# Patient Record
Sex: FEMALE | Race: WHITE
[De-identification: names, ages, dates, MRNs, and addresses within clinical notes are randomized per-mention and may not be internally consistent; named-entity substitution may affect disease eponyms.]

---

## 2018-04-03 ENCOUNTER — HOSPITAL ENCOUNTER (OUTPATIENT)
Dept: HOSPITAL 96 - M.ERS | Age: 83
Setting detail: OBSERVATION
LOS: 1 days | Discharge: HOME | End: 2018-04-04
Attending: INTERNAL MEDICINE | Admitting: INTERNAL MEDICINE
Payer: MEDICARE

## 2018-04-03 VITALS — DIASTOLIC BLOOD PRESSURE: 61 MMHG | SYSTOLIC BLOOD PRESSURE: 127 MMHG

## 2018-04-03 VITALS — SYSTOLIC BLOOD PRESSURE: 119 MMHG | DIASTOLIC BLOOD PRESSURE: 48 MMHG

## 2018-04-03 VITALS — BODY MASS INDEX: 32.79 KG/M2 | HEIGHT: 60 IN | WEIGHT: 167 LBS

## 2018-04-03 VITALS — DIASTOLIC BLOOD PRESSURE: 64 MMHG | SYSTOLIC BLOOD PRESSURE: 171 MMHG

## 2018-04-03 VITALS — DIASTOLIC BLOOD PRESSURE: 62 MMHG | SYSTOLIC BLOOD PRESSURE: 155 MMHG

## 2018-04-03 DIAGNOSIS — E78.5: ICD-10-CM

## 2018-04-03 DIAGNOSIS — I12.9: ICD-10-CM

## 2018-04-03 DIAGNOSIS — I65.23: ICD-10-CM

## 2018-04-03 DIAGNOSIS — E11.65: ICD-10-CM

## 2018-04-03 DIAGNOSIS — I73.9: ICD-10-CM

## 2018-04-03 DIAGNOSIS — R79.89: ICD-10-CM

## 2018-04-03 DIAGNOSIS — I16.0: ICD-10-CM

## 2018-04-03 DIAGNOSIS — Z82.49: ICD-10-CM

## 2018-04-03 DIAGNOSIS — M19.012: ICD-10-CM

## 2018-04-03 DIAGNOSIS — N18.3: ICD-10-CM

## 2018-04-03 DIAGNOSIS — E11.22: ICD-10-CM

## 2018-04-03 DIAGNOSIS — Z85.41: ICD-10-CM

## 2018-04-03 DIAGNOSIS — R07.89: Primary | ICD-10-CM

## 2018-04-03 DIAGNOSIS — D64.9: ICD-10-CM

## 2018-04-03 LAB
ABSOLUTE BASOPHILS: 0 THOU/UL (ref 0–0.2)
ABSOLUTE EOSINOPHILS: 0 THOU/UL (ref 0–0.7)
ABSOLUTE MONOCYTES: 0.8 THOU/UL (ref 0–1.2)
ALBUMIN SERPL-MCNC: 3.3 G/DL (ref 3.4–5)
ALP SERPL-CCNC: 73 U/L (ref 46–116)
ALT SERPL-CCNC: 15 U/L (ref 30–65)
ANION GAP SERPL CALC-SCNC: 8 MMOL/L (ref 7–16)
APTT BLD: 25.9 SECONDS (ref 25–31.3)
AST SERPL-CCNC: 16 U/L (ref 15–37)
BASOPHILS NFR BLD AUTO: 0.4 %
BILIRUB SERPL-MCNC: 0.4 MG/DL
BUN SERPL-MCNC: 21 MG/DL (ref 7–18)
CALCIUM SERPL-MCNC: 8.2 MG/DL (ref 8.5–10.1)
CHLORIDE SERPL-SCNC: 107 MMOL/L (ref 98–107)
CK-MB MASS: 0.8 NG/ML
CO2 SERPL-SCNC: 28 MMOL/L (ref 21–32)
CREAT SERPL-MCNC: 1.4 MG/DL (ref 0.6–1.3)
EOSINOPHIL NFR BLD: 0.2 %
GLUCOSE SERPL-MCNC: 261 MG/DL (ref 70–99)
GRANULOCYTES NFR BLD MANUAL: 75.2 %
HCT VFR BLD CALC: 33 % (ref 37–47)
HGB BLD-MCNC: 11.4 GM/DL (ref 12–15)
INR PPP: 1
LIPASE: 262 U/L (ref 73–393)
LYMPHOCYTES # BLD: 1.1 THOU/UL (ref 0.8–5.3)
LYMPHOCYTES NFR BLD AUTO: 14.2 %
MAGNESIUM SERPL-MCNC: 1 MG/DL (ref 1.8–2.4)
MCH RBC QN AUTO: 31.1 PG (ref 26–34)
MCHC RBC AUTO-ENTMCNC: 34.5 G/DL (ref 28–37)
MCV RBC: 90.1 FL (ref 80–100)
MONOCYTES NFR BLD: 10 %
MPV: 8.7 FL. (ref 7.2–11.1)
NEUTROPHILS # BLD: 6 THOU/UL (ref 1.6–8.1)
NT-PRO BRAIN NAT PEPTIDE: 669 PG/ML (ref ?–300)
NUCLEATED RBCS: 0 /100WBC
PLATELET COUNT*: 139 THOU/UL (ref 150–400)
POTASSIUM SERPL-SCNC: 3.9 MMOL/L (ref 3.5–5.1)
PROT SERPL-MCNC: 7 G/DL (ref 6.4–8.2)
PROTHROMBIN TIME: 10.1 SECONDS (ref 9.2–11.5)
RBC # BLD AUTO: 3.66 MIL/UL (ref 4.2–5)
RDW-CV: 13.1 % (ref 10.5–14.5)
SODIUM SERPL-SCNC: 143 MMOL/L (ref 136–145)
TROPONIN-I LEVEL: <0.06 NG/ML (ref ?–0.06)
WBC # BLD AUTO: 7.9 THOU/UL (ref 4–11)

## 2018-04-03 NOTE — NUR
RECIEVED REPORT FROM DRARIAN IN ED AND ASSUMED CARE OF PT AT 1700. PT
VSS,TRACING SR ON MONITOR,LUNG SOUNDS ARE CLEAR ON ROOM AIR, O2 %.PT
STATES HAD BM THIS MORNING.IV RIGHT AC SALINE LOCKED AND PATENT. PT IS CALM
AND COOPERATIVE WITH NO C/O PAIN. PT IS UP AD JAMES WITH [facility]. PT WAS LEFT IN
RECLINER CHAIR WITH CALL LIGHT IN PLACE.HOURLY ROUNDING COMPLETED FOR PT
SAFETY.PT INFORMED OF PLAN OF CARE AND COMMUNICATES UNDERSTANDING. PT IS TO BE
NPO AFTER MIDNIGHT AND WAS EDUCATED. NURSE WILL PASS TO NIGHT SHIFT. WILL
CONTINUE TO MONITOR FOR DURATION OF SHIFT.

## 2018-04-03 NOTE — EKG
[place] [address]
Phone:  [phone]                     ELECTROCARDIOGRAM REPORT      
_______________________________________________________________________________
 
Name:       COJOSEPH BAPTISTEDERRELL STARR                 Room:           [place] [address]   ADM IN  
..#:  [id number]      Account #:      [id number]  
Admission:  18     Attend Phys:    Tara Gold MD 
Discharge:               Date of Birth:  [birth year]  
         Report #: [id number]
    [id number]
_______________________________________________________________________________
THIS REPORT FOR:  //name//                      
 
                         [facility]
                                       
Test Date:    2018               Test Time:    14:11:35
Pat Name:     TRUDY BROWN             Department:   
Patient ID:   [id number]            Room:         [facility]
Gender:       F                        Technician:   
:          1935               Requested By: Butch Miller
Order Number: [id number] MD:   Gregor Thomson
                                 Measurements
Intervals                              Axis          
Rate:         81                       P:            44
NH:           146                      QRS:          -35
QRSD:         132                      T:            5
QT:           376                                    
QTc:          437                                    
                           Interpretive Statements
Sinus rhythm
Right bundle branch block
Left ventricular hypertrophy
No previous ECG available for comparison
 
Electronically Signed On 4-3-2018 16:27:51 CDT by Gregor Thomson
[email or web address]
 
 
 
 
 
 
 
 
 
 
 
 
 
 
 
 
 
 
  <ELECTRONICALLY SIGNED>
                                           By: Gregor Thomson MD, [facility]      
  18     1627
D: 18 1411   _____________________________________
T: 18 141   Gregor Thomson MD, FACC        /EPI

## 2018-04-04 VITALS — SYSTOLIC BLOOD PRESSURE: 154 MMHG | DIASTOLIC BLOOD PRESSURE: 57 MMHG

## 2018-04-04 VITALS — DIASTOLIC BLOOD PRESSURE: 57 MMHG | SYSTOLIC BLOOD PRESSURE: 154 MMHG

## 2018-04-04 VITALS — DIASTOLIC BLOOD PRESSURE: 60 MMHG | SYSTOLIC BLOOD PRESSURE: 124 MMHG

## 2018-04-04 VITALS — DIASTOLIC BLOOD PRESSURE: 55 MMHG | SYSTOLIC BLOOD PRESSURE: 113 MMHG

## 2018-04-04 VITALS — DIASTOLIC BLOOD PRESSURE: 63 MMHG | SYSTOLIC BLOOD PRESSURE: 140 MMHG

## 2018-04-04 LAB
ANION GAP SERPL CALC-SCNC: 8 MMOL/L (ref 7–16)
BUN SERPL-MCNC: 23 MG/DL (ref 7–18)
CALCIUM SERPL-MCNC: 8 MG/DL (ref 8.5–10.1)
CHLORIDE SERPL-SCNC: 106 MMOL/L (ref 98–107)
CHOLEST SERPL-MCNC: 132 MG/DL (ref ?–200)
CO2 SERPL-SCNC: 27 MMOL/L (ref 21–32)
CREAT SERPL-MCNC: 1.3 MG/DL (ref 0.6–1.3)
GLUCOSE SERPL-MCNC: 116 MG/DL (ref 70–99)
HDLC SERPL-MCNC: 70 MG/DL (ref 40–?)
LDLC SERPL-MCNC: 51 MG/DL (ref ?–100)
MAGNESIUM SERPL-MCNC: 3 MG/DL (ref 1.8–2.4)
POTASSIUM SERPL-SCNC: 4 MMOL/L (ref 3.5–5.1)
SODIUM SERPL-SCNC: 141 MMOL/L (ref 136–145)
TC:HDL: 1.9 RATIO
TRIGL SERPL-MCNC: 57 MG/DL (ref ?–150)
VLDLC SERPL CALC-MCNC: 11 MG/DL (ref ?–40)

## 2018-04-04 NOTE — 2DMMODE
[place] [address]
Phone:  [phone] 2 D/M-MODE ECHOCARDIOGRAM     
_______________________________________________________________________________
 
Name:         TRUDY BROWN                Room:          [address]
KIM#:    [id number]     Account #:     [id number]  
Admission:    18    Attend Phys:   Tara Gold, 
Discharge:                Date of Birth: [birth year]  
Date of Service: 18 1424  Report #:      [id number]
        [id number]
_______________________________________________________________________________
THIS REPORT FOR:  //name//                      
 
 
--------------- APPROVED REPORT --------------
 
 
Study performed:  2018 11:34:29
 
EXAM: Comprehensive 2D, Doppler, and color-flow 
Echocardiogram 
Patient Location: In-Patient   
Room #:  [facility]     Status:  routine
 
      BSA:         1.69
HR: 78 bpm BP:          124/60 mmHg 
Rhythm: NSR     
 
Other Information 
Study Quality: Good
 
Indications
Chest Pain
 
2D Dimensions
   LVEF(%):  71.54 (&gt;50%)
IVSd:  12.42 (7-11mm) LVOT Diam:  18.23 (18-24mm) 
LVDd:  45.15 mm  
PWd:  9.71 (7-11mm) Ascending Ao:  31.31 (22-36mm)
LVDs:  26.80 (25-40mm) 
Aortic Root:  28.60 mm 
   Bean's LVEF:  71.54 %
 
Volumes
Left Atrial Volume (Systole) 
    LA ESV Index:  25.70 mL/m2
 
Aortic Valve
AoV Peak Urban.:  1.69 m/s 
AO Peak Gr.:  11.49 mmHg LVOT Max P.00 mmHg
AO Mean Gr.:  6.30 mmHg  LVOT Mean P.92 mmHg
    LVOT Max V:  1.22 m/s
AO V2 VTI:  36.36 cm  LVOT Mean V:  0.78 m/s
GARY (VTI):  2.02 cm2  LVOT V1 VTI:  28.14 cm
AI McCormick:  3.63 m/s2  
AI PHT:  334.66 ms  
 
 
 
[place] [address]
Phone:  [phone]                     2 D/M-MODE ECHOCARDIOGRAM     
_______________________________________________________________________________
 
Name:         TRUDY BROWN                Room:          [address]#:    [id number]     Account #:     [id number]  
Admission:    18    Attend Phys:   Tara Gold, 
Discharge:                Date of Birth: [birth year]  
Date of Service: 18 1424  Report #:      [id number]
        [id number]
_______________________________________________________________________________
Mitral Valve
    E/A Ratio:  0.74
    MV Decel. Time:  248.63 ms
MV E Max Urban.:  0.77 m/s 
MV PHT:  72.10 ms  
MVA (PHT):  3.05 cm2  
 
TDI
E/Lateral E':  7.70 E/Medial E':  9.63
   Medial E' Urban.:  0.08 m/s
   Lateral E' Urban.:  0.10 m/s
 
Pulmonary Valve
PV Peak Urban.:  1.22 m/s PV Peak Gr.:  5.98 mmHg
 
Tricuspid Valve
TR Peak Gr.:  26.30 mmHg RVSP:  31.00 mmHg
 
Left Ventricle
The left ventricle is normal size. There is normal LV segmental wall 
motion. There is normal left ventricular wall thickness. Left 
ventricular systolic function is normal. The left ventricular 
ejection fraction is within the normal range. LVEF is 60-65%. Grade I 
- abnormal relaxation pattern.
 
Right Ventricle
The right ventricle is normal size. The right ventricular systolic 
function is normal.
 
Atria
The left atrium size is normal. The right atrium size is 
normal.
 
Aortic Valve
The aortic valve is normal in structure. Mild aortic regurgitation. 
There is no aortic valvular stenosis.
 
Mitral Valve
The mitral valve is normal in structure. Trace mitral regurgitation. 
No evidence of mitral valve stenosis.
 
Tricuspid Valve
The tricuspid valve is normal in structure. Mild tricuspid 
regurgitation. The RVSP is 30-35 mmHg.
 
Pulmonic Valve
 
 
[place] [address]
Phone:  [phone]                     2 D/M-MODE ECHOCARDIOGRAM     
_______________________________________________________________________________
 
Name:         TRUDY BROWN MATTY                Room:          [address]
M.R.#:    [id number]     Account #:     [id number]  
Admission:    18    Attend Phys:   Tara Gold, 
Discharge:                Date of Birth: [birth year]  
Date of Service: 18 1424  Report #:      [id number]
        [id number]
_______________________________________________________________________________
Pulmonic valve is not well visualized. There is no pulmonic valvular 
regurgitation.
 
Great Vessels
The aortic root is normal in size. IVC is normal in size and 
collapses with &gt;50% inspiration
 
Pericardium
There is no pericardial effusion.
 
&lt;Conclusion&gt;
LVEF is 60-65%.
Mild aortic regurgitation.
 
 
 
 
 
 
 
 
 
 
 
 
 
 
 
 
 
 
 
 
 
 
 
 
 
 
 
 
 
 
 
  <ELECTRONICALLY SIGNED>
                                           By: Gregor Thomson MD, FACC      
  18     1424
D: 18 1424   _____________________________________
T: 18 1424   Gregor Thomson MD, FACC        /INF

## 2018-04-04 NOTE — EKG
[place] [address]
Phone:  [phone]                     ELECTROCARDIOGRAM REPORT      
_______________________________________________________________________________
 
Name:       COJOSEPH BAPTISTELEY MATTY                 Room:           [address]R.#:  [id number]      Account #:      [id number]  
Admission:  18     Attend Phys:    Tara Gold MD 
Discharge:               Date of Birth:  [birth year]  
         Report #: [id number]
    [id number]
_______________________________________________________________________________
THIS REPORT FOR:  //name//                      
 
                          [facility]
                                       
Test Date:    2018               Test Time:    10:20:26
Pat Name:     TRUDY BROWN             Department:   
Patient ID:   [id number]            Room:         [address]
Gender:       F                        Technician:   IAN
:          1935               Requested By: Butch Miller
Order Number: [id number]    Reading MD:   Gregor Thomson
                                 Measurements
Intervals                              Axis          
Rate:         76                       P:            47
CA:           153                      QRS:          -33
QRSD:         130                      T:            2
QT:           400                                    
QTc:          450                                    
                           Interpretive Statements
Sinus rhythm
Right bundle branch block
Left ventricular hypertrophy
Compared to ECG 2018 14:11:35
No significant changes
 
Electronically Signed On 2018 15:48:23 CDT by Gregor Thomson
[email or web address]
 
 
 
 
 
 
 
 
 
 
 
 
 
 
 
 
 
  <ELECTRONICALLY SIGNED>
                                           By: Gregor Thomson MD, [facility]      
  18     1548
D: 18 1020   _____________________________________
T: 18 1020   Gregor Thomson MD, [facility]        /EPI

## 2018-04-04 NOTE — NUR
Pt rested well overnight.  VSS.  NPO since MN for Cardiology consult.  Pt
reports continued pain to L shoulder, rates 3-4/10.  Will continue to monitor.

## 2018-04-04 NOTE — NUR
ASSUMED PT CARE AT 0700 PT IS ALERT AND ORIENTED X 4 PT IS NOT A FALL RISK PT
IS UP AD JAMES, PT STATES SOME PAIN IN LEFT SHOULDER DENIES CHEST PAIN OR SOA,
PT SEEN BY HOSPITALIST WHO ORDERED TO DISCHARGE PT WHEN CLEARED BY CARDIOLOGY
AWAITING CARDIOLOGY TO SEE PT WHO IS NPO UNTIL SEEN BY CARDIOLOGY, PT HAS ECHO
ORDERED ULTRASOUND IN ROOM OBTAINING ULTRASOUND, PT IS SR ON THE MONITOR WILL
CONTINUE TO MONITOR

## 2018-04-04 NOTE — NUR
MET WITH PT TO DISCUSS HOME SITUATION/DC PLANNING. PT LIVES WITH HER 'KIDS'.
STATES SHE IS INDEPENDENT AND ACTIVE.  USES NO EQUIPMENT OR HOME CARE.  PLANS
TO RETURN HOME AT DC.  WILL FOLLOW

## 2018-04-05 LAB
EST. AVERAGE GLUCOSE BLD GHB EST-MCNC: 108 MG/DL
GLYCOHEMOGLOBIN (HGB A1C): 5.4 % (ref 4.8–5.6)

## 2018-04-05 NOTE — CON
[address]  [address]                    CONSULTATION                  
_______________________________________________________________________________
 
Name:       TRUDY BROWN                 Room:           [address] Bill ALVAREZ#:  [id number]      Account #:      [id number]  
Admission:  04/03/18     Attend Phys:    Tara Gold MD 
Discharge:  04/04/18     Date of Birth:  [birth year]  
         Report #: [id number]
                                                                     [id number]  
_______________________________________________________________________________
THIS REPORT FOR:  //name//                      
 
CC: Vinayak Gold
 
DATE OF SERVICE:  04/04/2018
 
 
HISTORY OF PRESENT ILLNESS:  The patient is an 82-year-old single white female
who I was asked to see in the hospital after she complained of chest pain.  The
patient has no previous history of heart disease.  She does not exercise on a
regular basis because of her age.  She states she had a stress test years ago. 
She states she was doing well until yesterday she was at home when she started
noticing a pain in her left shoulder.  It is worse when she moves the arm.  She
denied any trauma to the arm.  No swelling or rash.  She also noticed some
heaviness in her chest.  The heaviness was not related to food.  She has had no
recent bleeding, coughing.  She denied any trauma to her chest.  She took her
blood pressure and it was elevated.  Her son brought her to the emergency room
and she was admitted.  She denied any associated nausea, diaphoresis, shortness
of breath.  She denies any recent exertional dyspnea or syncope.
 
PAST MEDICAL HISTORY:  Otherwise, significant for cervical cancer and she had a
hysterectomy.  She has had cataract surgery, ankle surgery.  She notes a year
ago, she was noted to be anemic, was admitted to [facility], had a transfusion. 
She underwent upper and lower endoscopy and apparently no source of bleeding was
found.  She has a history of hypertension, hyperlipidemia.
 
MEDICATIONS:  Consists of lisinopril, metoprolol, Protonix, simvastatin.
 
ALLERGIES:  She has no known drug allergies.
 
FAMILY HISTORY:  Her sister had congestive heart failure.
 
SOCIAL HISTORY:  She is , lives with her son in [place], [place].  No
history of smoking or alcohol abuse.
 
REVIEW OF SYSTEMS:  She has had no history of stroke, asthma, liver disease,
kidney disease, psychiatric illness, chronic skin condition.  Does wear glasses.
 
PHYSICAL EXAMINATION:
GENERAL:  Revealed an elderly female lying in bed.  She appeared in no distress.
VITAL SIGNS:  Her blood pressure 160/60, pulse is 80, she is afebrile.
HEENT:  She was anicteric, conjunctivae pink.  Mucous members moist.
NECK:  Veins nondistended.  Bilateral carotid bruits were heard.  Neck was
supple.
 
 
 
[place] [address]                    CONSULTATION                  
_______________________________________________________________________________
 
Name:       TRUDY BROWN                 Room:           [address] Bill ALVAREZ#:  [id number]      Account #:      [id number]  
Admission:  04/03/18     Attend Phys:    Tara Gold MD 
Discharge:  04/04/18     Date of Birth:  [birth year]  
         Report #: [id number]
                                                                     [id number]  
_______________________________________________________________________________
CHEST:  Clear to auscultation.
CARDIAC:  Regular rate and rhythm, grade 2 systolic ejection murmur.
ABDOMEN:  Soft, nontender.
EXTREMITIES:  Had trace edema.  Dorsalis pedis pulse 2+ bilaterally.
SKIN:  Warm, dry.
NEUROLOGIC:  Nonfocal.
LYMPH:  No adenopathy.
MUSCULOSKELETAL:  No joint effusion.
 
ECG showed a sinus rhythm with a right bundle branch block.  On her workup
yesterday, she had portable chest x-ray that showed cardiomegaly, evidence of a
hiatal hernia.  CT scan of the chest using a PE protocol showed gallstones,
atelectasis, right renal cyst.  She actually had a VQ scan of the lungs that
showed no evidence of pulmonary embolus.
 
LABORATORY DATA:  Sodium 141, BUN 23, creatinine 1.3, glucose 116.  Liver
function studies were normal.  Troponin 0.06.  Cholesterol 132, triglyceride 57,
HDL 70, LDL 51.  White blood cell count 7.9, hemoglobin 11.4.
 
IMPRESSION AND RECOMMENDATIONS:
1.  Left shoulder pain.  Suspect arthritis.
2.  Chest heaviness.  No evidence of acute myocardial infarction.  Suspect
noncardiac.
3.  Hypertension.  The patient has been on a beta-blocker and ACE inhibitor.
4.  Hyperlipidemia.  The patient is on a statin drug.
5.  History anemia.  Previous GI workup showed no bleeding.
6.  History of cervical cancer.
 
 
 
 
 
 
 
 
 
 
 
 
 
 
 
 
 
<ELECTRONICALLY SIGNED>
                                        By:  Gregor Thomson MD, FACC      
04/05/18     1742
D: 04/04/18 1248_______________________________________
T: 04/04/18 1458Dakamila Thomson MD, FAC         /nt

## 2021-12-01 ENCOUNTER — HOSPITAL ENCOUNTER (INPATIENT)
Dept: HOSPITAL 96 - M.ERS | Age: 86
LOS: 14 days | Discharge: NURSING, REHAB OR LONG-TERM CARE | DRG: 871 | End: 2021-12-15
Attending: INTERNAL MEDICINE | Admitting: INTERNAL MEDICINE
Payer: MEDICARE

## 2021-12-01 VITALS — SYSTOLIC BLOOD PRESSURE: 112 MMHG | DIASTOLIC BLOOD PRESSURE: 49 MMHG

## 2021-12-01 VITALS — SYSTOLIC BLOOD PRESSURE: 102 MMHG | DIASTOLIC BLOOD PRESSURE: 45 MMHG

## 2021-12-01 VITALS — WEIGHT: 159 LBS | BODY MASS INDEX: 31.22 KG/M2 | HEIGHT: 60 IN

## 2021-12-01 VITALS — SYSTOLIC BLOOD PRESSURE: 66 MMHG | DIASTOLIC BLOOD PRESSURE: 36 MMHG

## 2021-12-01 DIAGNOSIS — G93.41: ICD-10-CM

## 2021-12-01 DIAGNOSIS — I47.1: ICD-10-CM

## 2021-12-01 DIAGNOSIS — Z92.21: ICD-10-CM

## 2021-12-01 DIAGNOSIS — E87.6: ICD-10-CM

## 2021-12-01 DIAGNOSIS — N17.0: ICD-10-CM

## 2021-12-01 DIAGNOSIS — N18.6: ICD-10-CM

## 2021-12-01 DIAGNOSIS — R54: ICD-10-CM

## 2021-12-01 DIAGNOSIS — I12.0: ICD-10-CM

## 2021-12-01 DIAGNOSIS — Z90.710: ICD-10-CM

## 2021-12-01 DIAGNOSIS — Z85.41: ICD-10-CM

## 2021-12-01 DIAGNOSIS — I48.91: ICD-10-CM

## 2021-12-01 DIAGNOSIS — Z93.6: ICD-10-CM

## 2021-12-01 DIAGNOSIS — J96.01: ICD-10-CM

## 2021-12-01 DIAGNOSIS — C52: ICD-10-CM

## 2021-12-01 DIAGNOSIS — M19.90: ICD-10-CM

## 2021-12-01 DIAGNOSIS — E88.09: ICD-10-CM

## 2021-12-01 DIAGNOSIS — J15.6: ICD-10-CM

## 2021-12-01 DIAGNOSIS — M48.061: ICD-10-CM

## 2021-12-01 DIAGNOSIS — A40.3: Primary | ICD-10-CM

## 2021-12-01 DIAGNOSIS — R62.7: ICD-10-CM

## 2021-12-01 DIAGNOSIS — D64.9: ICD-10-CM

## 2021-12-01 DIAGNOSIS — J13: ICD-10-CM

## 2021-12-01 DIAGNOSIS — E43: ICD-10-CM

## 2021-12-01 DIAGNOSIS — E11.22: ICD-10-CM

## 2021-12-01 DIAGNOSIS — Z85.3: ICD-10-CM

## 2021-12-01 LAB
ABSOLUTE MONOCYTES: 1.2 THOU/UL (ref 0–1.2)
ALBUMIN SERPL-MCNC: 1.6 G/DL (ref 3.4–5)
ALP SERPL-CCNC: 150 U/L (ref 46–116)
ALT SERPL-CCNC: 25 U/L (ref 30–65)
ANION GAP SERPL CALC-SCNC: 23 MMOL/L (ref 7–16)
AST SERPL-CCNC: 47 U/L (ref 15–37)
BE: -9.6 MMOL/L
BILIRUB SERPL-MCNC: 0.8 MG/DL
BUN SERPL-MCNC: 110 MG/DL (ref 7–18)
CALCIUM SERPL-MCNC: 8.6 MG/DL (ref 8.5–10.1)
CHLORIDE SERPL-SCNC: 93 MMOL/L (ref 98–107)
CO2 SERPL-SCNC: 16 MMOL/L (ref 21–32)
CREAT SERPL-MCNC: 6.4 MG/DL (ref 0.6–1.3)
GLUCOSE SERPL-MCNC: 152 MG/DL (ref 70–99)
GRANULOCYTES NFR BLD MANUAL: 82 %
HCT VFR BLD CALC: 21.5 % (ref 37–47)
HCT VFR BLD CALC: 22.2 % (ref 37–47)
HGB BLD-MCNC: 7.2 GM/DL (ref 12–15)
HGB BLD-MCNC: 7.4 GM/DL (ref 12–15)
LG PLATELETS BLD QL SMEAR: (no result)
LYMPHOCYTES # BLD: 0.4 THOU/UL (ref 0.8–5.3)
LYMPHOCYTES NFR BLD AUTO: 2 %
MACROCYTES: (no result)
MCH RBC QN AUTO: 26.8 PG (ref 26–34)
MCHC RBC AUTO-ENTMCNC: 33.4 G/DL (ref 28–37)
MCV RBC: 80.3 FL (ref 80–100)
MONOCYTES NFR BLD: 6 %
MPV: 9.6 FL. (ref 7.2–11.1)
NEUTROPHILS # BLD: 18.2 THOU/UL (ref 1.6–8.1)
NEUTS BAND NFR BLD: 10 %
NUCLEATED RBCS: 0 /100WBC
PCO2 BLD: 23.3 MMHG (ref 35–45)
PLATELET # BLD EST: ADEQUATE 10*3/UL
PLATELET COUNT*: 171 THOU/UL (ref 150–400)
PO2 BLD: 135.1 MMHG (ref 75–100)
POTASSIUM SERPL-SCNC: 3 MMOL/L (ref 3.5–5.1)
PROT SERPL-MCNC: 6.3 G/DL (ref 6.4–8.2)
RBC # BLD AUTO: 2.76 MIL/UL (ref 4.2–5)
RDW-CV: 15.5 % (ref 10.5–14.5)
SODIUM SERPL-SCNC: 132 MMOL/L (ref 136–145)
WBC # BLD AUTO: 19.8 THOU/UL (ref 4–11)

## 2021-12-01 PROCEDURE — 30233N1 TRANSFUSION OF NONAUTOLOGOUS RED BLOOD CELLS INTO PERIPHERAL VEIN, PERCUTANEOUS APPROACH: ICD-10-PCS | Performed by: INTERNAL MEDICINE

## 2021-12-01 NOTE — CON
[address]  [address]                    CONSULTATION                  
_______________________________________________________________________________
 
Name:       TRUDY BROWN MATTY                 Room:           [address] [place]  
M.R.#:  [id number]      Account #:      [id number]  
Admission:  12/01/21     Attend Phys:    ALTHEA Basilio
Discharge:               Date of Birth:  [birth year]  
         Report #: [id number]
                                                                     [id number]
_______________________________________________________________________________
THIS REPORT FOR:  
 
cc:  Vinayak Sequeira MD, John J. MD Elia, Manana MD                                                    ~
 
 
DATE OF CONSULTATION: 12/02/2021
 
REASON FOR CONSULTATION:  Squamous cell carcinoma of the vagina.
 
REFERRING PHYSICIAN:  Lilli La MD
 
HISTORY OF PRESENT ILLNESS:  The patient is an 86-year-old woman who is 
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  [facility]  She is a poor historian.  She 
does not know what type of chemotherapy she is getting.  She was brought to the 
hospital with extreme weakness, unable to walk.  She had severe anorexia and the
patient was brought to the hospital by her son.  Oncology consult is requested. 
The patient is not able to give me any history.  She is diagnosed with 
pneumonia.  She is on antibiotics.  She has acute on chronic renal failure, 
atrial fibrillation.
 
PAST MEDICAL HISTORY:  Significant for atrial fibrillation, hypertension, 
squamous cell carcinoma of the vagina, status post nasal cannula.  She has a 
history of nephrostomy tube.
 
PHYSICAL EXAMINATION:
GENERAL:  Reveals chronically ill-appearing older woman, not in acute distress.
VITAL SIGNS:  Blood pressure 99/42, heart rate 88, temperature 97.2.
NECK:  Supple.  There is no supraclavicular lymphadenopathy.
HEART:  S1, S2.
LUNGS:  Coarse.
ABDOMEN:  Soft.
MENTAL STATUS:  Alert, but not able to give me accurate history.
 
LABORATORY DATA:  White count 12.8, hemoglobin 7.1, platelets 121.  Sodium 132, 
potassium ____, , creatinine 5.2.  CT of chest shows infiltrate.  CT of 
head negative.
 
ASSESSMENT AND PLAN:  Squamous cell carcinoma of the vagina.  The patient is not
tolerating current chemotherapy obviously.  I advised her to discuss further 
chemotherapy options/adjustment of the dose with her oncologist when she is 
discharged from the hospital.  Otherwise, I agree with management.  Follow up 
with oncologist when she is discharged.
 
 
 
 
[place] [address]                    CONSULTATION                  
_______________________________________________________________________________
 
Name:       COONTRUDY A                 Room:           [address] [place]  
[facility]#:  [id number]      Account #:      [id number]  
Admission:  12/01/21     Attend Phys:    ALTHEA Basilio
Discharge:               Date of Birth:  [birth year]  
         Report #: [id number]
                                                                     [id number]
_______________________________________________________________________________
 
 
____ anemia.  Transfuse if hemoglobin less than 7.0.  I do not have any other 
suggestions.  We will follow the patient from periphery.
 
 
 
 
 
 
 
 
 
 
 
 
 
 
 
 
 
 
 
 
 
 
 
 
 
 
 
 
 
 
 
 
 
 
 
 
 
 
 
 
                       
                                        By:                                
                 
D: 12/02/21 2249_______________________________________
T: 12/03/21 0507Dena Weston MD                  /nt

## 2021-12-01 NOTE — EKG
[place] [address]
Phone:  [phone]                     ELECTROCARDIOGRAM REPORT      
_______________________________________________________________________________
 
Name:         TRUDY BROWN                Room:                     [facility] 
M.R.#:    [id number]     Account #:     [id number]  
Admission:    21    Attend Phys:                     
Discharge:                Date of Birth: [birth year]  
Date of Service: 21 1333  Report #:      [id number]
        [id number]
_______________________________________________________________________________
THIS REPORT FOR:  //name//                      
 
                         [facility] ED
                                       
Test Date:    2021               Test Time:    13:33:33
Pat Name:     TRUDY BROWN             Department:   
Patient ID:   [id number]            Room:          
Gender:                               Technician:   
:          1935               Requested By: Denilson Erazo
Order Number: [id number] MD:   Vinayak Mendez
                                 Measurements
Intervals                              Axis          
Rate:         159                      P:            
NV:                                    QRS:          -22
QRSD:         152                      T:            18
QT:           315                                    
QTc:          513                                    
                           Interpretive Statements
Atrial fibrillation with a very rapid ventricular response
Right bundle branch block
Nonspecific ST-T abnormality
Since the prior tracing, atrial fibrillation with a rapid response has
developed
and nonspecific ST-T changes have occurred
Electronically Signed On 2021 14:26:49 CST by Vinayak Mendez
[email or web address]
 
 
 
 
 
 
 
 
 
 
 
 
 
 
 
 
 
 
  <ELECTRONICALLY SIGNED>
                                           By: Vinayak Mendez MD, [facility]     
  21     1426
D: 21 1333   _____________________________________
T: 21 1333   Vinayak Mendez MD, [facility]       /EPI

## 2021-12-02 VITALS — SYSTOLIC BLOOD PRESSURE: 104 MMHG | DIASTOLIC BLOOD PRESSURE: 44 MMHG

## 2021-12-02 VITALS — DIASTOLIC BLOOD PRESSURE: 43 MMHG | SYSTOLIC BLOOD PRESSURE: 113 MMHG

## 2021-12-02 VITALS — DIASTOLIC BLOOD PRESSURE: 42 MMHG | SYSTOLIC BLOOD PRESSURE: 97 MMHG

## 2021-12-02 VITALS — DIASTOLIC BLOOD PRESSURE: 43 MMHG | SYSTOLIC BLOOD PRESSURE: 100 MMHG

## 2021-12-02 VITALS — SYSTOLIC BLOOD PRESSURE: 102 MMHG | DIASTOLIC BLOOD PRESSURE: 45 MMHG

## 2021-12-02 VITALS — DIASTOLIC BLOOD PRESSURE: 42 MMHG | SYSTOLIC BLOOD PRESSURE: 99 MMHG

## 2021-12-02 LAB
ABSOLUTE BASOPHILS: 0.1 THOU/UL (ref 0–0.2)
ABSOLUTE EOSINOPHILS: 0 THOU/UL (ref 0–0.7)
ABSOLUTE MONOCYTES: 0.1 THOU/UL (ref 0–1.2)
ANION GAP SERPL CALC-SCNC: 17 MMOL/L (ref 7–16)
BASOPHILS NFR BLD AUTO: 0.9 %
BUN SERPL-MCNC: 106 MG/DL (ref 7–18)
CALCIUM SERPL-MCNC: 7.6 MG/DL (ref 8.5–10.1)
CHLORIDE SERPL-SCNC: 98 MMOL/L (ref 98–107)
CO2 SERPL-SCNC: 17 MMOL/L (ref 21–32)
CREAT SERPL-MCNC: 5.7 MG/DL (ref 0.6–1.3)
EOSINOPHIL NFR BLD: 0.3 %
GLUCOSE SERPL-MCNC: 145 MG/DL (ref 70–99)
GRANULOCYTES NFR BLD MANUAL: 97.3 %
HCT VFR BLD CALC: 21.2 % (ref 37–47)
HGB BLD-MCNC: 7.1 GM/DL (ref 12–15)
LYMPHOCYTES # BLD: 0.1 THOU/UL (ref 0.8–5.3)
LYMPHOCYTES NFR BLD AUTO: 0.9 %
MAGNESIUM SERPL-MCNC: 1.8 MG/DL (ref 1.8–2.4)
MCH RBC QN AUTO: 26.7 PG (ref 26–34)
MCHC RBC AUTO-ENTMCNC: 33.4 G/DL (ref 28–37)
MCV RBC: 79.8 FL (ref 80–100)
MONOCYTES NFR BLD: 0.6 %
MPV: 9.1 FL. (ref 7.2–11.1)
NEUTROPHILS # BLD: 12.5 THOU/UL (ref 1.6–8.1)
NUCLEATED RBCS: 0 /100WBC
PHOSPHATE SERPL-MCNC: 5.9 MG/DL (ref 2.5–4.9)
PLATELET COUNT*: 121 THOU/UL (ref 150–400)
POTASSIUM SERPL-SCNC: 2.5 MMOL/L (ref 3.5–5.1)
RBC # BLD AUTO: 2.66 MIL/UL (ref 4.2–5)
RDW-CV: 15.4 % (ref 10.5–14.5)
SODIUM SERPL-SCNC: 132 MMOL/L (ref 136–145)
WBC # BLD AUTO: 12.8 THOU/UL (ref 4–11)

## 2021-12-02 PROCEDURE — 5A09357 ASSISTANCE WITH RESPIRATORY VENTILATION, LESS THAN 24 CONSECUTIVE HOURS, CONTINUOUS POSITIVE AIRWAY PRESSURE: ICD-10-PCS | Performed by: INTERNAL MEDICINE

## 2021-12-02 NOTE — 2DMMODE
[place] [address]
Phone:  [phone] 2 D/M-MODE ECHOCARDIOGRAM     
_______________________________________________________________________________
 
Name:         TRUDY BROWN                Room:          [address] [place] 
[facility]#:    [id number]     Account #:     [id number]  
Admission:    21    Attend Phys:   Lilli La
Discharge:                Date of Birth: [birth year]  
Date of Service: 21 1328  Report #:      [id number]
        [id number]
_______________________________________________________________________________
THIS REPORT FOR:
 
cc:  Vinayak Sequeira MD, John J. MD Holkins, John M. MD [facility]       
                                                                       ~
 
--------------- APPROVED REPORT --------------
 
 
Study performed:  2021 10:58:32
 
EXAM: Comprehensive 2D, Doppler, and color-flow 
Echocardiogram 
Patient Location: In-Patient   
Room #:  [facility]     Status:  routine
 
      BSA:         1.93
HR: 85 bpm BP:          102/45 mmHg 
Rhythm: NSR     
 
Other Information 
Study Quality: Good
 
Indications
Atrial Fibrillation
 
2D Dimensions
IVSd:  9.74 (7-11mm) LVOT Diam:  18.29 (18-24mm) 
LVDd:  39.80 mm  
PWd:  10.85 (7-11mm) Ascending Ao:  30.52 (22-36mm)
LVDs:  20.91 (25-40mm) 
Aortic Root:  28.56 mm 
 
Volumes
Left Atrial Volume (Systole) 
    LA ESV Index:  31.00 mL/m2
 
Aortic Valve
AoV Peak Urban.:  1.77 m/s 
AO Peak Gr.:  12.51 mmHg LVOT Max P.71 mmHg
AO Mean Gr.:  7.02 mmHg  LVOT Mean PG:  3.92 mmHg
    LVOT Max V:  1.48 m/s
AO V2 VTI:  32.45 cm  LVOT Mean V:  0.89 m/s
GARY (VTI):  2.25 cm2  LVOT V1 VTI:  27.74 cm
AI [place]:  3.21 m/s2  
 
 
[place] [address]
Phone:  [phone]                     2 D/M-MODE ECHOCARDIOGRAM     
_______________________________________________________________________________
 
Name:         TRUDY BROWN                Room:          [address] [place] 
..#:    [id number]     Account #:     [id number]  
Admission:    21    Attend Phys:   Lilli La
Discharge:                Date of Birth: [birth year]  
Date of Service: 21 1328  Report #:      [id number]
        [id number]
_______________________________________________________________________________
AI PHT:  281.01 ms  
 
Mitral Valve
    E/A Ratio:  0.99
    MV Decel. Time:  232.06 ms
MV E Max Urban.:  0.83 m/s 
MV PHT:  67.30 ms  
MVA (PHT):  3.27 cm2  
 
TDI
E/Lateral E':  8.30 E/Medial E':  11.86
   Medial E' Urban.:  0.07 m/s
   Lateral E' Urban.:  0.10 m/s
 
Pulmonary Valve
PV Peak Urban.:  1.38 m/s PV Peak Gr.:  7.60 mmHg
 
Tricuspid Valve
    RAP Estimate:  5.00 mmHg
TR Peak Gr.:  34.04 mmHg RVSP:  39.00 mmHg
    PA Pressure:  39.00 mmHg
 
Left Ventricle
The left ventricle is normal size. There is normal LV segmental wall 
motion. There is normal left ventricular wall thickness. Left 
ventricular systolic function is normal. The left ventricular 
ejection fraction is within the normal range. LVEF is 60-65%. Grade I 
- abnormal relaxation pattern.
 
Right Ventricle
The right ventricle is normal size. The right ventricular systolic 
function is normal.
 
Atria
The left atrium size is normal. The right atrium size is 
normal.
 
Aortic Valve
Mild aortic valve sclerosis. Mild aortic regurgitation. There is no 
aortic valvular stenosis.
 
Mitral Valve
The mitral valve is normal in structure. There is no mitral valve 
regurgitation noted. No evidence of mitral valve stenosis.
 
Tricuspid Valve
 
 
[place] [address]
Phone:  [phone]                     2 D/M-MODE ECHOCARDIOGRAM     
_______________________________________________________________________________
 
Name:         TRUDY BROWN MATTY                Room:          [address]#:    [id number]     Account #:     [id number]  
Admission:    21    Attend Phys:   Lilli La
Discharge:                Date of Birth: [birth year]  
Date of Service: 21 1328  Report #:      [id number]
        [id number]
_______________________________________________________________________________
The tricuspid valve is normal in structure. Mild tricuspid 
regurgitation. Mild pulmonary hypertension.
 
Pulmonic Valve
The pulmonary valve is normal in structure. There is no pulmonic 
valvular regurgitation.
 
Great Vessels
The aortic root is normal in size. IVC is normal in size and 
collapses >50% with inspiration.
 
Pericardium
There is no pericardial effusion.
 
<Conclusion>
The left ventricle is normal size.
There is normal left ventricular wall thickness.
Left ventricular systolic function is normal.
The left ventricular ejection fraction is within the normal 
range.
LVEF is 60-65%.
Grade I - abnormal relaxation pattern.
The right ventricle is normal size.
The left atrium size is normal.
Mild aortic valve sclerosis.
Mild aortic regurgitation.
There is no aortic valvular stenosis.
The mitral valve is normal in structure.
The tricuspid valve is normal in structure.
Mild tricuspid regurgitation.
Mild pulmonary hypertension.
IVC is normal in size and collapses >50% with inspiration.
There is no pericardial effusion.
There is normal LV segmental wall motion.
 
 
 
 
 
 
 
 
 
 
  <ELECTRONICALLY SIGNED>
                                           By: Vinayak Mendez MD, FACC     
  21     1328
D: 21 1328   _____________________________________
T: 21 1328   Vinayak Mendez MD, FACC       /INF

## 2021-12-02 NOTE — EKG
[place] [address]
Phone:  [phone]                     ELECTROCARDIOGRAM REPORT      
_______________________________________________________________________________
 
Name:         TRUDY BROWN                Room:          [address]    ADM IN 
M.R.#:    [id number]     Account #:     [id number]  
Admission:    21    Attend Phys:   Lilli La
Discharge:                Date of Birth: [birth year]  
Date of Service: 21 1344  Report #:      [id number]
        [id number]
_______________________________________________________________________________
THIS REPORT FOR:  //name//                      
 
                         [facility] ED
                                       
Test Date:    2021               Test Time:    13:44:30
Pat Name:     TRUDY BROWN             Department:   
Patient ID:   [id number]            Room:         [address]
Gender:       F                        Technician:   TP
:          1935               Requested By: Denilson Erazo
Order Number: [id number] MD:   Vinayak Mendez
                                 Measurements
Intervals                              Axis          
Rate:         100                      P:            22
PA:           163                      QRS:          -28
QRSD:         155                      T:            19
QT:           382                                    
QTc:          493                                    
                           Interpretive Statements
Mild sinus tachycardia with rare PAC
IVCD, consider atypical RBBB
Left ventricular hypertrophy
Compared to ECG 2021 13:33:33
Rhythm is sinus with rare PACs
Left ventricular hypertrophy now present
Atrial fibrillation no longer present
ST (T wave) deviation no longer present
Electronically Signed On 2021 10:59:31 CST by Vinayak Mendez
[email or web address]
 
 
 
 
 
 
 
 
 
 
 
 
 
 
 
 
  <ELECTRONICALLY SIGNED>
                                           By: Vinayak Mendez MD, [facility]     
  21     1059
D: 21 1344   _____________________________________
T: 21 1344   Vinayak Mendez MD, [facility]       /EPI

## 2021-12-02 NOTE — EKG
[place] [address]
Phone:  [phone]                     ELECTROCARDIOGRAM REPORT      
_______________________________________________________________________________
 
Name:         TRUDY BROWN                Room:          [address]    ADM IN 
.R.#:    [id number]     Account #:     [id number]  
Admission:    21    Attend Phys:   Lilli La
Discharge:                Date of Birth: [birth year]  
Date of Service: 21 1031  Report #:      [id number]
        [id number]
_______________________________________________________________________________
THIS REPORT FOR:  //name//                      
 
                          [facility]
                                       
Test Date:    2021               Test Time:    10:31:26
Pat Name:     TRUDY BROWN             Department:   
Patient ID:   [id number]            Room:         [address]
Gender:       F                        Technician:   SABA
:          1935               Requested By: Judy Jack
Order Number: [id number]    Amy MD:   Vinayak Mendez
                                 Measurements
Intervals                              Axis          
Rate:         86                       P:            29
AK:           146                      QRS:          -29
QRSD:         158                      T:            13
QT:           420                                    
QTc:          503                                    
                           Interpretive Statements
Sinus rhythm
IVCD, consider atypical RBBB
Left ventricular hypertrophy
Prolonged QT interval
Baseline wander in lead(s) V5
Compared to ECG 2021 13:44:30
Prolonged QT interval now present
Sinus tachycardia no longer present
Atrial premature complex(es) no longer present
Electronically Signed On 2021 13:14:02 CST by Vinayak Mendez
[email or web address]
 
 
 
 
 
 
 
 
 
 
 
 
 
 
 
  <ELECTRONICALLY SIGNED>
                                           By: Vinayak Mendez MD, [facility]     
  21     1314
D: 21 1031   _____________________________________
T: 21 1031   Vinayak Mendez MD, [facility]       /EPI

## 2021-12-03 VITALS — DIASTOLIC BLOOD PRESSURE: 41 MMHG | SYSTOLIC BLOOD PRESSURE: 99 MMHG

## 2021-12-03 VITALS — SYSTOLIC BLOOD PRESSURE: 138 MMHG | DIASTOLIC BLOOD PRESSURE: 50 MMHG

## 2021-12-03 VITALS — DIASTOLIC BLOOD PRESSURE: 47 MMHG | SYSTOLIC BLOOD PRESSURE: 104 MMHG

## 2021-12-03 VITALS — DIASTOLIC BLOOD PRESSURE: 50 MMHG | SYSTOLIC BLOOD PRESSURE: 122 MMHG

## 2021-12-03 VITALS — DIASTOLIC BLOOD PRESSURE: 46 MMHG | SYSTOLIC BLOOD PRESSURE: 123 MMHG

## 2021-12-03 LAB
ABSOLUTE BASOPHILS: 0 THOU/UL (ref 0–0.2)
ABSOLUTE EOSINOPHILS: 0 THOU/UL (ref 0–0.7)
ABSOLUTE MONOCYTES: 0 THOU/UL (ref 0–1.2)
ALBUMIN SERPL-MCNC: 1.1 G/DL (ref 3.4–5)
ALP SERPL-CCNC: 115 U/L (ref 46–116)
ALT SERPL-CCNC: 18 U/L (ref 30–65)
ANION GAP SERPL CALC-SCNC: 16 MMOL/L (ref 7–16)
APTT BLD: 37.6 SECONDS (ref 25–31.3)
AST SERPL-CCNC: 31 U/L (ref 15–37)
BASOPHILS NFR BLD AUTO: 0.1 %
BE: -13.1 MMOL/L
BILIRUB SERPL-MCNC: 0.5 MG/DL
BUN SERPL-MCNC: 106 MG/DL (ref 7–18)
CALCIUM SERPL-MCNC: 7.8 MG/DL (ref 8.5–10.1)
CHLORIDE SERPL-SCNC: 101 MMOL/L (ref 98–107)
CO2 SERPL-SCNC: 13 MMOL/L (ref 21–32)
CREAT SERPL-MCNC: 5.6 MG/DL (ref 0.6–1.3)
EOSINOPHIL NFR BLD: 0.2 %
GLUCOSE SERPL-MCNC: 192 MG/DL (ref 70–99)
GRANULOCYTES NFR BLD MANUAL: 98.6 %
HCT VFR BLD CALC: 21.7 % (ref 37–47)
HGB BLD-MCNC: 7 GM/DL (ref 12–15)
INR PPP: 1.1
LYMPHOCYTES # BLD: 0.1 THOU/UL (ref 0.8–5.3)
LYMPHOCYTES NFR BLD AUTO: 0.6 %
MAGNESIUM SERPL-MCNC: 1.7 MG/DL (ref 1.8–2.4)
MCH RBC QN AUTO: 26.9 PG (ref 26–34)
MCHC RBC AUTO-ENTMCNC: 32.4 G/DL (ref 28–37)
MCV RBC: 83 FL (ref 80–100)
MONOCYTES NFR BLD: 0.5 %
MPV: 9.5 FL. (ref 7.2–11.1)
NEUTROPHILS # BLD: 10 THOU/UL (ref 1.6–8.1)
NUCLEATED RBCS: 0 /100WBC
PCO2 BLD: 22.8 MMHG (ref 35–45)
PHOSPHATE SERPL-MCNC: 5.3 MG/DL (ref 2.5–4.9)
PLATELET COUNT*: 115 THOU/UL (ref 150–400)
PO2 BLD: 122.9 MMHG (ref 75–100)
POTASSIUM SERPL-SCNC: 3.8 MMOL/L (ref 3.5–5.1)
PROT SERPL-MCNC: 5.2 G/DL (ref 6.4–8.2)
PROTHROMBIN TIME: 11.3 SECONDS (ref 9.2–11.5)
RBC # BLD AUTO: 2.61 MIL/UL (ref 4.2–5)
RDW-CV: 16.2 % (ref 10.5–14.5)
SODIUM SERPL-SCNC: 130 MMOL/L (ref 136–145)
WBC # BLD AUTO: 10.1 THOU/UL (ref 4–11)

## 2021-12-03 PROCEDURE — 5A09357 ASSISTANCE WITH RESPIRATORY VENTILATION, LESS THAN 24 CONSECUTIVE HOURS, CONTINUOUS POSITIVE AIRWAY PRESSURE: ICD-10-PCS | Performed by: INTERNAL MEDICINE

## 2021-12-03 NOTE — CON
[address]  [address]                    CONSULTATION                  
_______________________________________________________________________________
 
Name:       COONTRUDY MATTY                 Room:           [address] [place]  
.R.#:  [id number]      Account #:      [id number]  
Admission:  12/01/21     Attend Phys:    ALTHEA Basilio
Discharge:               Date of Birth:  [birth year]  
         Report #: [id number]
                                                                     [id number]
_______________________________________________________________________________
THIS REPORT FOR:  
 
cc:  Vinayak Sequeira MD, John J. MD Pervez, Adeel MD                                                   ~
 
 
DATE OF CONSULTATION: 12/02/2021
 
CONSULT REQUESTED BY:  Lilli La MD.
 
INDICATION FOR CONSULTATION:  Pulmonary infiltrates.
 
HISTORY OF PRESENT ILLNESS:  An 86-year-old female.  She has a past medical 
history as mentioned below.  This includes a history of various malignancies 
including vaginal squamous cell carcinoma, vesicovaginal fistula.  The patient 
has bilateral nephrostomy tubes in place.  The previous creatinine from a few 
years ago was around 1.4, was reported to be 2.2 about a month ago.  The patient
has now presented with altered mental status and respiratory distress.  The 
patient also is reported to have a narrow complex tachycardia consistent with 
atrial fibrillation and she has had a cardioversion due to hemodynamic 
instability.
 
The patient does have florid infiltrates in her left lung, most of her left 
upper lobe is consolidated.  There are some infiltrates in the left lower lobe 
as well.  She is in acute renal failure and a creatinine of 5.7.  Her potassium 
was last noted to be critically low at 2.5.  There is a significant metabolic 
acidosis as well with a bicarb of 13.  She is maintaining pH by maintaining a 
high minute ventilation.  The patient does have some swelling of lower 
extremities.  She is able to swallow.
 
REVIEW OF SYSTEMS:  For 12 points is negative except as mentioned above.  She is
limited in answering questions.
 
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  Also, history of cervical cancer 
and bilateral nephrostomy tubes, history of breast cancer, status post 
lumpectomy, obstructive sleep apnea, has had a CPAP at home.  Reports that she 
has had poor compliance, hypertension.  She did have atrial fibrillation now.  
It is not known to me as to whether the patient has had atrial fibrillation in 
the past as well.  Malnutrition with a Port-A-Cath in place.  She has an 
echocardiogram recently performed shows a left ventricular ejection fraction 
60-65%, pulmonary artery systolic 39.
 
SOCIAL HISTORY:  The patient is able to provide only a limited history; however,
there is no known history of smoking, ethanol abuse, or drug abuse.
 
 
 
[place] [address]                    CONSULTATION                  
_______________________________________________________________________________
 
Name:       TRUDY BROWN                 Room:           [address] [place]  
[facility]#:  [id number]      Account #:      [id number]  
Admission:  12/01/21     Attend Phys:    ALTHEA Basilio
Discharge:               Date of Birth:  [birth year]  
         Report #: [id number]
                                                                     [id number]
_______________________________________________________________________________
 
 
 
PAST SURGICAL HISTORY:  Hysterectomy.
 
CURRENT MEDICATIONS:  List in [place] reviewed.
 
FAMILY HISTORY:  No pertinent family history.
 
PHYSICAL EXAMINATION:
GENERAL:  She is alert, awake and oriented, appears to be lethargic, but is able
to sit up and was having lunch at the time of my evaluation earlier today.
VITAL SIGNS:  Has a pulse of 86 and a blood pressure of 104/44, saturating 95% 
on 2 liters nasal cannula, afebrile with a temperature of 36.1.
HEENT:  Head is normocephalic and atraumatic.  Pupils equal and reactive.  There
is no throat erythema.
NECK:  Does not show raised JVP, asymmetry, mass or lymph nodes.
CHEST:  Breath sounds are bilaterally equal.  There are scattered rales in the 
left lung.
HEART:  Irregular, but there is no murmur.
ABDOMEN:  Soft and nontender.
EXTREMITIES:  Lower extremities show 2+ edema.  No calf tenderness.
SKIN:  Dry and intact.
NEUROLOGIC:  Moves all extremities bilaterally equally and spontaneously with no
focal deficit identified.
 
LABORATORY DATA:  The patient's lab work, CT of the chest, CT abdomen and pelvis
as well as renal ultrasound are in [facility] and these are reviewed.  Arterial 
blood gas also reviewed.
 
ASSESSMENT AND PLAN:
1.  Severe pneumonia/extensive pulmonary infiltrates.  Considering the severity 
of these infiltrates, I switched cefepime over to meropenem.  We will continue 
with linezolid.  Considering the severity of these infiltrates, the case was 
made to also continue with fluoroquinolone.  She; however, is on amiodarone and 
there will be some risk in continuing with a Quinolone.
She did receive Levaquin
yesterday and should have enough levels in blood until tomorrow.  Therefore, I 
decided to hold off on more Levaquin
and we will reassess tomorrow.  If we decide not to
give her more Levaquin, then giving her doxycycline intermittently, some 
atypical coverage could be a consideration.  Also, we will need to assess as to 
whether the patient should be continued on corticosteroids.  I decided to order 
one dose of Solu-Medrol now.  We will reassess tomorrow.
2.  Shortness of breath, one dose of Solu-Medrol as above.  Also, she is on 
nebulized bronchodilators.
 
 
 
[address]  [address]                    CONSULTATION                  
_______________________________________________________________________________
 
Name:       TRUDY BROWN                 Room:           [address] [place]  
[facility]#:  [id number]      Account #:      [id number]  
Admission:  12/01/21     Attend Phys:    ALTHEA Basilio
Discharge:               Date of Birth:  [birth year]  
         Report #: [id number]
                                                                     [id number]
_______________________________________________________________________________
 
 
3.  High aspiration risk/possible aspiration.  Recommend strict aspiration 
precautions.  I asked Speech to evaluate.
4.  Acute on chronic renal failure with metabolic acidosis and severe 
electrolyte abnormalities.  She does appear to be total body fluid overloaded; 
however, I agree with giving her IV fluids considering marked elevation in 
creatinine and her respiratory status is maintained at this time.  Potassium is 
being replaced by the Nephrology service.  I will order an arterial blood gas 
tomorrow morning.  If there is a significant metabolic acidosis persisting, then
I suggest considering giving her bicarbonate.  Cautious administration of 
albumin may also be a consideration.  Her albumin was 1.6 yesterday.  I would 
defer this to the Nephrology service as well.
5.  Atrial fibrillation with rapid ventricular response.  Heart rate is now 
within the normal range.  Cardiology service is on the case.
6.  Pedal edema/evaluation for thromboembolic phenomena.  I ordered a D-dimer 
for tomorrow morning.  If elevated, then recommend obtaining venous Dopplers.  
The patient; however, will be high risk for anticoagulation.
7.  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 
recurrence.
8.  Status post bilateral nephrostomy tube placement.
9.  History of breast cancer.
10.  Severe malnutrition with an albumin of 1.6.
 
Thanks for this consultation.
 
 
 
 
 
 
 
 
 
 
 
 
 
 
 
 
 
 
 
<ELECTRONICALLY SIGNED>
                                        By:  Danis Nj MD              
12/03/21     1925
D: 12/02/21 2007_______________________________________
T: 12/02/21 2058AMD afua Enrique

## 2021-12-04 VITALS — DIASTOLIC BLOOD PRESSURE: 64 MMHG | SYSTOLIC BLOOD PRESSURE: 140 MMHG

## 2021-12-04 VITALS — SYSTOLIC BLOOD PRESSURE: 139 MMHG | DIASTOLIC BLOOD PRESSURE: 57 MMHG

## 2021-12-04 VITALS — DIASTOLIC BLOOD PRESSURE: 59 MMHG | SYSTOLIC BLOOD PRESSURE: 151 MMHG

## 2021-12-04 VITALS — DIASTOLIC BLOOD PRESSURE: 61 MMHG | SYSTOLIC BLOOD PRESSURE: 148 MMHG

## 2021-12-04 VITALS — DIASTOLIC BLOOD PRESSURE: 58 MMHG | SYSTOLIC BLOOD PRESSURE: 130 MMHG

## 2021-12-04 LAB
ABSOLUTE BASOPHILS: 0 THOU/UL (ref 0–0.2)
ABSOLUTE EOSINOPHILS: 0 THOU/UL (ref 0–0.7)
ABSOLUTE MONOCYTES: 0.1 THOU/UL (ref 0–1.2)
ALBUMIN SERPL-MCNC: 1.8 G/DL (ref 3.4–5)
ANION GAP SERPL CALC-SCNC: 15 MMOL/L (ref 7–16)
BASOPHILS NFR BLD AUTO: 0.1 %
BILIRUB UR-MCNC: NEGATIVE MG/DL
BUN SERPL-MCNC: 108 MG/DL (ref 7–18)
CALCIUM SERPL-MCNC: 8.6 MG/DL (ref 8.5–10.1)
CHLORIDE SERPL-SCNC: 99 MMOL/L (ref 98–107)
CO2 SERPL-SCNC: 20 MMOL/L (ref 21–32)
COLOR UR: YELLOW
CREAT SERPL-MCNC: 4.8 MG/DL (ref 0.6–1.3)
EOSINOPHIL NFR BLD: 0 %
GLUCOSE SERPL-MCNC: 326 MG/DL (ref 70–99)
GRANULOCYTES NFR BLD MANUAL: 97.2 %
HCT VFR BLD CALC: 21.4 % (ref 37–47)
HGB BLD-MCNC: 7.2 GM/DL (ref 12–15)
KETONES UR STRIP-MCNC: NEGATIVE MG/DL
LYMPHOCYTES # BLD: 0.2 THOU/UL (ref 0.8–5.3)
LYMPHOCYTES NFR BLD AUTO: 1.7 %
MAGNESIUM SERPL-MCNC: 1.7 MG/DL (ref 1.8–2.4)
MCH RBC QN AUTO: 26.7 PG (ref 26–34)
MCHC RBC AUTO-ENTMCNC: 33.7 G/DL (ref 28–37)
MCV RBC: 79.3 FL (ref 80–100)
MONOCYTES NFR BLD: 1 %
MPV: 9.5 FL. (ref 7.2–11.1)
NEUTROPHILS # BLD: 8.9 THOU/UL (ref 1.6–8.1)
NUCLEATED RBCS: 0 /100WBC
PHOSPHATE SERPL-MCNC: 4.5 MG/DL (ref 2.5–4.9)
PLATELET COUNT*: 116 THOU/UL (ref 150–400)
POTASSIUM SERPL-SCNC: 3.7 MMOL/L (ref 3.5–5.1)
PROT UR QL STRIP: (no result)
RBC # BLD AUTO: 2.7 MIL/UL (ref 4.2–5)
RBC # UR STRIP: (no result) /UL
RDW-CV: 16.4 % (ref 10.5–14.5)
SODIUM SERPL-SCNC: 134 MMOL/L (ref 136–145)
SP GR UR STRIP: 1.01 (ref 1–1.03)
URINE CLARITY: CLEAR
URINE GLUCOSE-RANDOM: (no result)
URINE LEUKOCYTES-REFLEX: NEGATIVE
URINE NITRITE-REFLEX: NEGATIVE
UROBILINOGEN UR STRIP-ACNC: 0.2 E.U./DL (ref 0.2–1)
WBC # BLD AUTO: 9.1 THOU/UL (ref 4–11)

## 2021-12-04 NOTE — EKG
[place] [address]
Phone:  [phone]                     ELECTROCARDIOGRAM REPORT      
_______________________________________________________________________________
 
Name:         TRUDY BROWN                Room:          [address]    ADM IN 
M.R.#:    [id number]     Account #:     [id number]  
Admission:    21    Attend Phys:   Lilli La
Discharge:                Date of Birth: [birth year]  
Date of Service: 21 0951  Report #:      [id number]
        [id number]
_______________________________________________________________________________
THIS REPORT FOR:  //name//                      
 
                          [facility]
                                       
Test Date:    2021               Test Time:    09:51:18
Pat Name:     TRUDY BROWN             Department:   
Patient ID:   [id number]            Room:         [address]
Gender:       F                        Technician:   SABA
:          1935               Requested By: Judy Jack
Order Number: [id number]    Reading MD:   Lencho Rolon
                                 Measurements
Intervals                              Axis          
Rate:         84                       P:            39
DC:           149                      QRS:          -28
QRSD:         153                      T:            4
QT:           414                                    
QTc:          490                                    
                           Interpretive Statements
Sinus rhythm
Atypical right bundle branch block
Left ventricular hypertrophy, by voltage
borderline prolonged QT interval
Compared to ECG 2021 10:31:26
No significant changes
Electronically Signed On 2021 15:16:20 CST by Lencho Rolon
[email or web address]
 
 
 
 
 
 
 
 
 
 
 
 
 
 
 
 
 
 
  <ELECTRONICALLY SIGNED>
                                           By: Lencho Rolon MD, FACC   
  21     1516
D: 21 0951   _____________________________________
T: 21 0951   Lencho Rolon MD, FAC     /EPI

## 2021-12-05 VITALS — DIASTOLIC BLOOD PRESSURE: 62 MMHG | SYSTOLIC BLOOD PRESSURE: 146 MMHG

## 2021-12-05 VITALS — SYSTOLIC BLOOD PRESSURE: 141 MMHG | DIASTOLIC BLOOD PRESSURE: 70 MMHG

## 2021-12-05 VITALS — SYSTOLIC BLOOD PRESSURE: 161 MMHG | DIASTOLIC BLOOD PRESSURE: 60 MMHG

## 2021-12-05 LAB
ALBUMIN SERPL-MCNC: 1.6 G/DL (ref 3.4–5)
ALP SERPL-CCNC: 90 U/L (ref 46–116)
ALT SERPL-CCNC: 16 U/L (ref 30–65)
ANION GAP SERPL CALC-SCNC: 11 MMOL/L (ref 7–16)
ANION GAP SERPL CALC-SCNC: 12 MMOL/L (ref 7–16)
AST SERPL-CCNC: 15 U/L (ref 15–37)
BILIRUB SERPL-MCNC: 0.3 MG/DL
BUN SERPL-MCNC: 105 MG/DL (ref 7–18)
BUN SERPL-MCNC: 106 MG/DL (ref 7–18)
CALCIUM SERPL-MCNC: 9.1 MG/DL (ref 8.5–10.1)
CALCIUM SERPL-MCNC: 9.1 MG/DL (ref 8.5–10.1)
CHLORIDE SERPL-SCNC: 99 MMOL/L (ref 98–107)
CHLORIDE SERPL-SCNC: 99 MMOL/L (ref 98–107)
CO2 SERPL-SCNC: 24 MMOL/L (ref 21–32)
CO2 SERPL-SCNC: 25 MMOL/L (ref 21–32)
CREAT SERPL-MCNC: 4.1 MG/DL (ref 0.6–1.3)
CREAT SERPL-MCNC: 4.1 MG/DL (ref 0.6–1.3)
GLUCOSE SERPL-MCNC: 216 MG/DL (ref 70–99)
GLUCOSE SERPL-MCNC: 216 MG/DL (ref 70–99)
HCT VFR BLD CALC: 21.6 % (ref 37–47)
HGB BLD-MCNC: 7.3 GM/DL (ref 12–15)
MCH RBC QN AUTO: 26.8 PG (ref 26–34)
MCHC RBC AUTO-ENTMCNC: 33.7 G/DL (ref 28–37)
MCV RBC: 79.7 FL (ref 80–100)
MPV: 9.5 FL. (ref 7.2–11.1)
PLATELET COUNT*: 107 THOU/UL (ref 150–400)
POTASSIUM SERPL-SCNC: 3.3 MMOL/L (ref 3.5–5.1)
POTASSIUM SERPL-SCNC: 3.3 MMOL/L (ref 3.5–5.1)
PROT SERPL-MCNC: 5 G/DL (ref 6.4–8.2)
RBC # BLD AUTO: 2.72 MIL/UL (ref 4.2–5)
RDW-CV: 16 % (ref 10.5–14.5)
SODIUM SERPL-SCNC: 135 MMOL/L (ref 136–145)
SODIUM SERPL-SCNC: 135 MMOL/L (ref 136–145)
WBC # BLD AUTO: 9.9 THOU/UL (ref 4–11)

## 2021-12-06 VITALS — SYSTOLIC BLOOD PRESSURE: 124 MMHG | DIASTOLIC BLOOD PRESSURE: 70 MMHG

## 2021-12-06 VITALS — DIASTOLIC BLOOD PRESSURE: 78 MMHG | SYSTOLIC BLOOD PRESSURE: 171 MMHG

## 2021-12-06 LAB
ANION GAP SERPL CALC-SCNC: 9 MMOL/L (ref 7–16)
BUN SERPL-MCNC: 99 MG/DL (ref 7–18)
CALCIUM SERPL-MCNC: 9.4 MG/DL (ref 8.5–10.1)
CHLORIDE SERPL-SCNC: 100 MMOL/L (ref 98–107)
CO2 SERPL-SCNC: 27 MMOL/L (ref 21–32)
CREAT SERPL-MCNC: 3.3 MG/DL (ref 0.6–1.3)
GLUCOSE SERPL-MCNC: 124 MG/DL (ref 70–99)
M PNEUMO IGG SER IA-ACNC: <100 U/ML (ref 0–99)
M PNEUMO IGM SER IA-ACNC: <770 U/ML (ref 0–769)
POTASSIUM SERPL-SCNC: 3.7 MMOL/L (ref 3.5–5.1)
SODIUM SERPL-SCNC: 136 MMOL/L (ref 136–145)

## 2021-12-07 VITALS — DIASTOLIC BLOOD PRESSURE: 58 MMHG | SYSTOLIC BLOOD PRESSURE: 138 MMHG

## 2021-12-07 VITALS — DIASTOLIC BLOOD PRESSURE: 56 MMHG | SYSTOLIC BLOOD PRESSURE: 128 MMHG

## 2021-12-07 VITALS — DIASTOLIC BLOOD PRESSURE: 61 MMHG | SYSTOLIC BLOOD PRESSURE: 120 MMHG

## 2021-12-07 LAB
%HYPO/RBC NFR BLD AUTO: (no result) %
ABSOLUTE MONOCYTES: 0.8 THOU/UL (ref 0–1.2)
ALBUMIN SERPL-MCNC: 1.5 G/DL (ref 3.4–5)
ALP SERPL-CCNC: 93 U/L (ref 46–116)
ALT SERPL-CCNC: 12 U/L (ref 30–65)
ANION GAP SERPL CALC-SCNC: 11 MMOL/L (ref 7–16)
AST SERPL-CCNC: 15 U/L (ref 15–37)
BE: 1.9 MMOL/L
BILIRUB SERPL-MCNC: 0.5 MG/DL
BUN SERPL-MCNC: 87 MG/DL (ref 7–18)
CALCIUM SERPL-MCNC: 9.4 MG/DL (ref 8.5–10.1)
CHLORIDE SERPL-SCNC: 101 MMOL/L (ref 98–107)
CO2 SERPL-SCNC: 27 MMOL/L (ref 21–32)
CREAT SERPL-MCNC: 2.9 MG/DL (ref 0.6–1.3)
GLUCOSE SERPL-MCNC: 127 MG/DL (ref 70–99)
GRANULOCYTES NFR BLD MANUAL: 82 %
HCT VFR BLD CALC: 22.8 % (ref 37–47)
HGB BLD-MCNC: 7.5 GM/DL (ref 12–15)
LYMPHOCYTES # BLD: 0.7 THOU/UL (ref 0.8–5.3)
LYMPHOCYTES NFR BLD AUTO: 6 %
MAGNESIUM SERPL-MCNC: 1.2 MG/DL (ref 1.8–2.4)
MCH RBC QN AUTO: 26.5 PG (ref 26–34)
MCHC RBC AUTO-ENTMCNC: 32.7 G/DL (ref 28–37)
MCV RBC: 80.9 FL (ref 80–100)
MONOCYTES NFR BLD: 7 %
MPV: 9 FL. (ref 7.2–11.1)
NEUTROPHILS # BLD: 10.3 THOU/UL (ref 1.6–8.1)
NEUTS BAND NFR BLD: 5 %
NT-PRO BRAIN NAT PEPTIDE: 8874 PG/ML (ref ?–300)
NUCLEATED RBCS: 0 /100WBC
PCO2 BLD: 37.1 MMHG (ref 35–45)
PLATELET # BLD EST: (no result) 10*3/UL
PLATELET COUNT*: 81 THOU/UL (ref 150–400)
PO2 BLD: 55.4 MMHG (ref 75–100)
POTASSIUM SERPL-SCNC: 3.8 MMOL/L (ref 3.5–5.1)
PROT SERPL-MCNC: 5.4 G/DL (ref 6.4–8.2)
RBC # BLD AUTO: 2.82 MIL/UL (ref 4.2–5)
RDW-CV: 15.8 % (ref 10.5–14.5)
SODIUM SERPL-SCNC: 139 MMOL/L (ref 136–145)
WBC # BLD AUTO: 11.8 THOU/UL (ref 4–11)

## 2021-12-07 NOTE — EKG
[place] [address]
Phone:  [phone]                     ELECTROCARDIOGRAM REPORT      
_______________________________________________________________________________
 
Name:         TRUDY BROWN                Room:          [address]    ADM IN 
M.R.#:    [id number]     Account #:     [id number]  
Admission:    21    Attend Phys:   Lilli La
Discharge:                Date of Birth: [birth year]  
Date of Service: 21 0551  Report #:      [id number]
        [id number]
_______________________________________________________________________________
THIS REPORT FOR:  //name//                      
 
                          [facility]
                                       
Test Date:    2021               Test Time:    05:51:24
Pat Name:     TRUDY BROWN             Department:   
Patient ID:   [id number]            Room:         [address]
Gender:       F                        Technician:   AL
:          1935               Requested By: Julius Israel
Order Number: [id number]    Amy MD:   Gregor Thomson
                                 Measurements
Intervals                              Axis          
Rate:         83                       P:            30
FL:           146                      QRS:          -38
QRSD:         143                      T:            -10
QT:           390                                    
QTc:          459                                    
                           Interpretive Statements
Sinus rhythm
left axis
Right bundle branch block
Left ventricular hypertrophy
Baseline wander in lead(s) I,aVR
Compared to ECG 2021 09:51:18
No significant changes
Electronically Signed On 2021 16:14:44 CST by Gregor Thomson
[email or web address]
 
 
 
 
 
 
 
 
 
 
 
 
 
 
 
 
 
  <ELECTRONICALLY SIGNED>
                                           By: Gregor Thomson MD, FACC      
  21     1614
D: 21 0551   _____________________________________
T: 21 0551   Gregor Thomson MD, FAC        /EPI

## 2021-12-08 VITALS — SYSTOLIC BLOOD PRESSURE: 144 MMHG | DIASTOLIC BLOOD PRESSURE: 56 MMHG

## 2021-12-08 VITALS — DIASTOLIC BLOOD PRESSURE: 69 MMHG | SYSTOLIC BLOOD PRESSURE: 153 MMHG

## 2021-12-08 VITALS — DIASTOLIC BLOOD PRESSURE: 54 MMHG | SYSTOLIC BLOOD PRESSURE: 128 MMHG

## 2021-12-08 VITALS — DIASTOLIC BLOOD PRESSURE: 63 MMHG | SYSTOLIC BLOOD PRESSURE: 105 MMHG

## 2021-12-08 VITALS — DIASTOLIC BLOOD PRESSURE: 43 MMHG | SYSTOLIC BLOOD PRESSURE: 104 MMHG

## 2021-12-08 LAB
ABSOLUTE BASOPHILS: 0 THOU/UL (ref 0–0.2)
ABSOLUTE BASOPHILS: 0 THOU/UL (ref 0–0.2)
ABSOLUTE EOSINOPHILS: 0 THOU/UL (ref 0–0.7)
ABSOLUTE EOSINOPHILS: 0 THOU/UL (ref 0–0.7)
ABSOLUTE MONOCYTES: 0.5 THOU/UL (ref 0–1.2)
ABSOLUTE MONOCYTES: 0.6 THOU/UL (ref 0–1.2)
ALBUMIN SERPL-MCNC: 1.9 G/DL (ref 3.4–5)
ALBUMIN SERPL-MCNC: 2 G/DL (ref 3.4–5)
ALP SERPL-CCNC: 77 U/L (ref 46–116)
ALP SERPL-CCNC: 83 U/L (ref 46–116)
ALT SERPL-CCNC: 11 U/L (ref 30–65)
ALT SERPL-CCNC: 14 U/L (ref 30–65)
ANION GAP SERPL CALC-SCNC: 11 MMOL/L (ref 7–16)
ANION GAP SERPL CALC-SCNC: 9 MMOL/L (ref 7–16)
ANION GAP SERPL CALC-SCNC: 9 MMOL/L (ref 7–16)
APTT BLD: 33.7 SECONDS (ref 25–31.3)
AST SERPL-CCNC: 12 U/L (ref 15–37)
AST SERPL-CCNC: 12 U/L (ref 15–37)
BASOPHILS NFR BLD AUTO: 0.1 %
BASOPHILS NFR BLD AUTO: 0.1 %
BE: 6 MMOL/L
BILIRUB SERPL-MCNC: 0.4 MG/DL
BILIRUB SERPL-MCNC: 0.5 MG/DL
BUN SERPL-MCNC: 85 MG/DL (ref 7–18)
BUN SERPL-MCNC: 88 MG/DL (ref 7–18)
BUN SERPL-MCNC: 89 MG/DL (ref 7–18)
CALCIUM SERPL-MCNC: 9 MG/DL (ref 8.5–10.1)
CALCIUM SERPL-MCNC: 9.2 MG/DL (ref 8.5–10.1)
CALCIUM SERPL-MCNC: 9.3 MG/DL (ref 8.5–10.1)
CHLORIDE SERPL-SCNC: 102 MMOL/L (ref 98–107)
CHLORIDE SERPL-SCNC: 103 MMOL/L (ref 98–107)
CHLORIDE SERPL-SCNC: 104 MMOL/L (ref 98–107)
CO2 SERPL-SCNC: 28 MMOL/L (ref 21–32)
CO2 SERPL-SCNC: 28 MMOL/L (ref 21–32)
CO2 SERPL-SCNC: 29 MMOL/L (ref 21–32)
CREAT SERPL-MCNC: 2.7 MG/DL (ref 0.6–1.3)
CREAT SERPL-MCNC: 2.8 MG/DL (ref 0.6–1.3)
CREAT SERPL-MCNC: 2.8 MG/DL (ref 0.6–1.3)
EOSINOPHIL NFR BLD: 0.3 %
EOSINOPHIL NFR BLD: 0.3 %
GLUCOSE SERPL-MCNC: 159 MG/DL (ref 70–99)
GLUCOSE SERPL-MCNC: 161 MG/DL (ref 70–99)
GLUCOSE SERPL-MCNC: 167 MG/DL (ref 70–99)
GRANULOCYTES NFR BLD MANUAL: 87.1 %
GRANULOCYTES NFR BLD MANUAL: 87.8 %
HCT VFR BLD CALC: 18.1 % (ref 37–47)
HCT VFR BLD CALC: 24.8 % (ref 37–47)
HGB BLD-MCNC: 6 GM/DL (ref 12–15)
HGB BLD-MCNC: 6.1 GM/DL (ref 12–15)
HGB BLD-MCNC: 8.3 GM/DL (ref 12–15)
INR PPP: 1.1
LYMPHOCYTES # BLD: 0.3 THOU/UL (ref 0.8–5.3)
LYMPHOCYTES # BLD: 0.4 THOU/UL (ref 0.8–5.3)
LYMPHOCYTES NFR BLD AUTO: 4.4 %
LYMPHOCYTES NFR BLD AUTO: 5.2 %
MAGNESIUM SERPL-MCNC: 1.9 MG/DL (ref 1.8–2.4)
MAGNESIUM SERPL-MCNC: 2 MG/DL (ref 1.8–2.4)
MCH RBC QN AUTO: 26.6 PG (ref 26–34)
MCH RBC QN AUTO: 26.7 PG (ref 26–34)
MCH RBC QN AUTO: 26.8 PG (ref 26–34)
MCHC RBC AUTO-ENTMCNC: 33.1 G/DL (ref 28–37)
MCHC RBC AUTO-ENTMCNC: 33.1 G/DL (ref 28–37)
MCHC RBC AUTO-ENTMCNC: 33.4 G/DL (ref 28–37)
MCV RBC: 80.3 FL (ref 80–100)
MCV RBC: 80.4 FL (ref 80–100)
MCV RBC: 80.8 FL (ref 80–100)
MONOCYTES NFR BLD: 7.3 %
MONOCYTES NFR BLD: 7.4 %
MPV: 8.9 FL. (ref 7.2–11.1)
MPV: 9.3 FL. (ref 7.2–11.1)
MPV: 9.7 FL. (ref 7.2–11.1)
NEUTROPHILS # BLD: 6.5 THOU/UL (ref 1.6–8.1)
NEUTROPHILS # BLD: 6.6 THOU/UL (ref 1.6–8.1)
NUCLEATED RBCS: 0 /100WBC
NUCLEATED RBCS: 0 /100WBC
PCO2 BLD: 41.7 MMHG (ref 35–45)
PLATELET COUNT*: 58 THOU/UL (ref 150–400)
PLATELET COUNT*: 58 THOU/UL (ref 150–400)
PLATELET COUNT*: 64 THOU/UL (ref 150–400)
PO2 BLD: 403.7 MMHG (ref 75–100)
POTASSIUM SERPL-SCNC: 3.6 MMOL/L (ref 3.5–5.1)
POTASSIUM SERPL-SCNC: 3.7 MMOL/L (ref 3.5–5.1)
POTASSIUM SERPL-SCNC: 3.8 MMOL/L (ref 3.5–5.1)
PROT SERPL-MCNC: 4.9 G/DL (ref 6.4–8.2)
PROT SERPL-MCNC: 5.4 G/DL (ref 6.4–8.2)
PROTHROMBIN TIME: 11.1 SECONDS (ref 9.2–11.5)
RBC # BLD AUTO: 2.25 MIL/UL (ref 4.2–5)
RBC # BLD AUTO: 2.29 MIL/UL (ref 4.2–5)
RBC # BLD AUTO: 3.09 MIL/UL (ref 4.2–5)
RDW-CV: 15.8 % (ref 10.5–14.5)
RDW-CV: 15.9 % (ref 10.5–14.5)
RDW-CV: 16.2 % (ref 10.5–14.5)
SODIUM SERPL-SCNC: 140 MMOL/L (ref 136–145)
SODIUM SERPL-SCNC: 141 MMOL/L (ref 136–145)
SODIUM SERPL-SCNC: 142 MMOL/L (ref 136–145)
WBC # BLD AUTO: 7.2 THOU/UL (ref 4–11)
WBC # BLD AUTO: 7.5 THOU/UL (ref 4–11)
WBC # BLD AUTO: 7.6 THOU/UL (ref 4–11)

## 2021-12-08 PROCEDURE — 5A09357 ASSISTANCE WITH RESPIRATORY VENTILATION, LESS THAN 24 CONSECUTIVE HOURS, CONTINUOUS POSITIVE AIRWAY PRESSURE: ICD-10-PCS | Performed by: INTERNAL MEDICINE

## 2021-12-08 NOTE — EKG
[place] [address]
Phone:  [phone]                     ELECTROCARDIOGRAM REPORT      
_______________________________________________________________________________
 
Name:         TRUDY BROWN                Room:          [address]    ADM IN 
.R.#:    [id number]     Account #:     [id number]  
Admission:    21    Attend Phys:   Lilli La
Discharge:                Date of Birth: [birth year]  
Date of Service: 21 0928  Report #:      [id number]
        [id number]
_______________________________________________________________________________
THIS REPORT FOR:  //name//                      
 
                          [facility]
                                       
Test Date:    2021               Test Time:    09:28:23
Pat Name:     TRUDY BROWN             Department:   
Patient ID:   [id number]            Room:         [address]
Gender:       F                        Technician:   LISA
:          1935               Requested By: Julius Israel
Order Number: [id number]    Reading MD:   Gregor Thomson
                                 Measurements
Intervals                              Axis          
Rate:         124                      P:            0
OK:           104                      QRS:          73
QRSD:         135                      T:            54
QT:           336                                    
QTc:          483                                    
                           Interpretive Statements
atrial fibrillation
Nonspecific intraventricular conduction delay
Borderline repolarization abnormality
 
Compared to ECG 2021 05:51:24
 
ST (T wave) deviation now present
Sinus rhythm no longer present
 
Left ventricular hypertrophy no longer present
Electronically Signed On 2021 13:21:38 CST by Gregor Thomson
[email or web address]
 
 
 
 
 
 
 
 
 
 
 
 
 
 
  <ELECTRONICALLY SIGNED>
                                           By: Gregor Thomson MD, FACC      
  21     1321
D: 21   _____________________________________
T: 21   Gregor Thomson MD, FACC        /EPI

## 2021-12-08 NOTE — CON
[address]  [address]                    CONSULTATION                  
_______________________________________________________________________________
 
Name:       TRUDY BROWN                 Room:           [address] [place]  
M.R.#:  [id number]      Account #:      [id number]  
Admission:  12/01/21     Attend Phys:    ALTHEA Basilio
Discharge:               Date of Birth:  [birth year]  
         Report #: [id number]
                                                                     [id number]
_______________________________________________________________________________
THIS REPORT FOR:  
 
cc:  Vinayak Sequeira MD, John J. MD Khan, Abid R. MD                                                   ~
 
 
DATE OF CONSULTATION: 12/02/2021
 
NEPHROLOGY CONSULTATION
 
CONSULTING PHYSICIAN:  Dr. La.
 
REASON FOR CONSULTATION:  Acute kidney injury.
 
HISTORY OF PRESENT ILLNESS:  An 86-year-old female with a history of squamous 
cell carcinoma of the vagina, who was admitted with weakness and pneumonia and 
found to have a creatinine of 6. On 11/18, her creatinine was 2.2.  She is a 
very limited historian.  She has been seeing Dr. Amaral at [facility] and she had
a nephrostomy tube placed in October.  She denies any shortness of breath, no 
nausea, no vomiting, appears to be comfortable at present time.
 
REVIEW OF SYSTEMS:  Constitutional, psych, heme, eyes, ENT, respiratory, 
cardiac, GI, , endocrine, all negative except as documented above and as best 
can be ascertained.
 
PAST MEDICAL HISTORY:  Vaginal cancer, AFib, hypertension, history of type 2 
diabetes, sleep apnea, occasional CPAP use, history of breast cancer with a 
lumpectomy.
 
PAST SURGICAL HISTORY:  Hysterectomy.
 
CURRENT MEDICATIONS:  Reviewed.
 
SOCIAL HISTORY:  No tobacco.
 
FAMILY HISTORY:  Not pertinent in this 86-year-old female.
 
PHYSICAL EXAMINATION:
VITAL SIGNS:  Blood pressure is 102/45, pulse 92, respirations 20, temperature 
36.4.
GENERAL:  No acute distress.
EYES:  Open.
EARS:  Externally normal.
CARDIOVASCULAR:  Regular rate.  No rub.
LUNGS:  No crackles.
 
 
 
[place] [address]                    CONSULTATION                  
_______________________________________________________________________________
 
Name:       TRUDY BROWN                 Room:           [address]#:  [id number]      Account #:      [id number]  
Admission:  12/01/21     Attend Phys:    ALTHEA Basilio
Discharge:               Date of Birth:  [birth year]  
         Report #: [id number]
                                                                     [id number]
_______________________________________________________________________________
 
 
GASTROINTESTINAL:  Negative.
MUSCULOSKELETAL:  Nontender.
PSYCHIATRIC:  Awake,
 
LABORATORY DATA:  White cell count 19.8, hemoglobin 7.4, platelets 171.  Sodium 
132, potassium 3, chloride 93, bicarbonate 16, , creatinine 6.4, glucose 
152, calcium 8.6, albumin 1.6.
 
ASSESSMENT:
1.  Acute kidney injury with a creatinine of 6.4 on 12/01.  She has a history of
vaginal cancer with vesicovaginal fistula, suspected recurrence of her vaginal 
cancer.  Her creatinine on 11/18 was 2.2.  There is mention of her being on 
carboplatin. CT is consistent with chronic kidney disease, but no evidence of 
obstruction.
2.  Chronic kidney disease, early stage III, was last seen in our office by Dr. Sanchez in 2019 and in 09/2019, creatinine was 1.7 and serum immunofixation was
normal with a urine protein to creatinine ratio of 0.6.
3.  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 
recurrence of vaginal cancer with a history of cervical cancer diagnosed in 
1990, vaginal cancer was diagnosed in 2020.
4.  Right nephrostomy tube placed on 10/11, left nephrostomy tube placed on 
10/21 followed by Dr. Amaral at [facility].
5.  Atrial fibrillation with history of cardioversion.
6.  Hypertension.
7.  Pneumonia.
8.  Hypoalbuminemia severe with albumin of 1.6.
 
PLAN:  The patient is on antibiotics.  We will send stat lab now.  No lab was 
done today.  We will send a BMP, magnesium and CK and ask nursing to contact me 
with result.  We will start IV fluids.  If renal function is not improving, 
would consider less aggressive care and perhaps a transition to hospice care as 
her overall prognosis appears to be poor.  We will follow along with you.
 
Thank you for requesting my opinion in the care and management of this patient.
 
 
 
 
 
 
 
 
<ELECTRONICALLY SIGNED>
                                        By:  Enrico Oconnor MD              
12/08/21     1035
D: 12/02/21 1037_______________________________________
T: 12/02/21 1236Ajennifer Oconnor MD                 /nt

## 2021-12-09 VITALS — DIASTOLIC BLOOD PRESSURE: 47 MMHG | SYSTOLIC BLOOD PRESSURE: 147 MMHG

## 2021-12-09 VITALS — DIASTOLIC BLOOD PRESSURE: 55 MMHG | SYSTOLIC BLOOD PRESSURE: 137 MMHG

## 2021-12-09 VITALS — DIASTOLIC BLOOD PRESSURE: 61 MMHG | SYSTOLIC BLOOD PRESSURE: 142 MMHG

## 2021-12-09 VITALS — SYSTOLIC BLOOD PRESSURE: 147 MMHG | DIASTOLIC BLOOD PRESSURE: 65 MMHG

## 2021-12-09 VITALS — DIASTOLIC BLOOD PRESSURE: 58 MMHG | SYSTOLIC BLOOD PRESSURE: 154 MMHG

## 2021-12-09 VITALS — DIASTOLIC BLOOD PRESSURE: 55 MMHG | SYSTOLIC BLOOD PRESSURE: 139 MMHG

## 2021-12-09 LAB
ABSOLUTE BASOPHILS: 0 THOU/UL (ref 0–0.2)
ABSOLUTE EOSINOPHILS: 0 THOU/UL (ref 0–0.7)
ABSOLUTE MONOCYTES: 0.6 THOU/UL (ref 0–1.2)
ALBUMIN SERPL-MCNC: 1.8 G/DL (ref 3.4–5)
ALP SERPL-CCNC: 80 U/L (ref 46–116)
ALT SERPL-CCNC: 8 U/L (ref 30–65)
ANION GAP SERPL CALC-SCNC: 9 MMOL/L (ref 7–16)
AST SERPL-CCNC: 11 U/L (ref 15–37)
BASOPHILS NFR BLD AUTO: 0.3 %
BILIRUB SERPL-MCNC: 0.3 MG/DL
BUN SERPL-MCNC: 79 MG/DL (ref 7–18)
CALCIUM SERPL-MCNC: 9.6 MG/DL (ref 8.5–10.1)
CHLORIDE SERPL-SCNC: 102 MMOL/L (ref 98–107)
CO2 SERPL-SCNC: 27 MMOL/L (ref 21–32)
CREAT SERPL-MCNC: 2.4 MG/DL (ref 0.6–1.3)
EOSINOPHIL NFR BLD: 0.2 %
GLUCOSE SERPL-MCNC: 148 MG/DL (ref 70–99)
GRANULOCYTES NFR BLD MANUAL: 85.1 %
HCT VFR BLD CALC: 18.5 % (ref 37–47)
HCT VFR BLD CALC: 24.2 % (ref 37–47)
HGB BLD-MCNC: 8 GM/DL (ref 12–15)
LYMPHOCYTES # BLD: 0.4 THOU/UL (ref 0.8–5.3)
LYMPHOCYTES NFR BLD AUTO: 5.8 %
MAGNESIUM SERPL-MCNC: 2 MG/DL (ref 1.8–2.4)
MCH RBC QN AUTO: 26.7 PG (ref 26–34)
MCHC RBC AUTO-ENTMCNC: 33.2 G/DL (ref 28–37)
MCV RBC: 80.6 FL (ref 80–100)
MONOCYTES NFR BLD: 8.6 %
MPV: 9.2 FL. (ref 7.2–11.1)
NEUTROPHILS # BLD: 6.1 THOU/UL (ref 1.6–8.1)
NUCLEATED RBCS: 0 /100WBC
PLATELET COUNT*: 60 THOU/UL (ref 150–400)
POTASSIUM SERPL-SCNC: 3.7 MMOL/L (ref 3.5–5.1)
PROT SERPL-MCNC: 5.9 G/DL (ref 6.4–8.2)
RBC # BLD AUTO: 3 MIL/UL (ref 4.2–5)
RDW-CV: 16.1 % (ref 10.5–14.5)
SODIUM SERPL-SCNC: 138 MMOL/L (ref 136–145)
WBC # BLD AUTO: 7.2 THOU/UL (ref 4–11)

## 2021-12-10 VITALS — SYSTOLIC BLOOD PRESSURE: 126 MMHG | DIASTOLIC BLOOD PRESSURE: 63 MMHG

## 2021-12-10 VITALS — DIASTOLIC BLOOD PRESSURE: 66 MMHG | SYSTOLIC BLOOD PRESSURE: 156 MMHG

## 2021-12-10 VITALS — SYSTOLIC BLOOD PRESSURE: 149 MMHG | DIASTOLIC BLOOD PRESSURE: 67 MMHG

## 2021-12-10 VITALS — SYSTOLIC BLOOD PRESSURE: 144 MMHG | DIASTOLIC BLOOD PRESSURE: 70 MMHG

## 2021-12-10 VITALS — DIASTOLIC BLOOD PRESSURE: 57 MMHG | SYSTOLIC BLOOD PRESSURE: 120 MMHG

## 2021-12-10 VITALS — DIASTOLIC BLOOD PRESSURE: 58 MMHG | SYSTOLIC BLOOD PRESSURE: 127 MMHG

## 2021-12-10 LAB
HCT VFR BLD CALC: 23.3 % (ref 37–47)
HGB BLD-MCNC: 7.8 GM/DL (ref 12–15)
MCH RBC QN AUTO: 27.1 PG (ref 26–34)
MCHC RBC AUTO-ENTMCNC: 33.5 G/DL (ref 28–37)
MCV RBC: 80.9 FL (ref 80–100)
MPV: 9.6 FL. (ref 7.2–11.1)
PLATELET COUNT*: 56 THOU/UL (ref 150–400)
RBC # BLD AUTO: 2.88 MIL/UL (ref 4.2–5)
RDW-CV: 15.9 % (ref 10.5–14.5)
WBC # BLD AUTO: 7 THOU/UL (ref 4–11)

## 2021-12-11 VITALS — DIASTOLIC BLOOD PRESSURE: 54 MMHG | SYSTOLIC BLOOD PRESSURE: 141 MMHG

## 2021-12-11 VITALS — DIASTOLIC BLOOD PRESSURE: 59 MMHG | SYSTOLIC BLOOD PRESSURE: 123 MMHG

## 2021-12-11 VITALS — DIASTOLIC BLOOD PRESSURE: 53 MMHG | SYSTOLIC BLOOD PRESSURE: 130 MMHG

## 2021-12-11 VITALS — SYSTOLIC BLOOD PRESSURE: 132 MMHG | DIASTOLIC BLOOD PRESSURE: 59 MMHG

## 2021-12-11 VITALS — SYSTOLIC BLOOD PRESSURE: 137 MMHG | DIASTOLIC BLOOD PRESSURE: 53 MMHG

## 2021-12-11 LAB
ANION GAP SERPL CALC-SCNC: 8 MMOL/L (ref 7–16)
BUN SERPL-MCNC: 65 MG/DL (ref 7–18)
CALCIUM SERPL-MCNC: 9.3 MG/DL (ref 8.5–10.1)
CHLORIDE SERPL-SCNC: 103 MMOL/L (ref 98–107)
CO2 SERPL-SCNC: 25 MMOL/L (ref 21–32)
CREAT SERPL-MCNC: 2.2 MG/DL (ref 0.6–1.3)
GLUCOSE SERPL-MCNC: 103 MG/DL (ref 70–99)
POTASSIUM SERPL-SCNC: 4.8 MMOL/L (ref 3.5–5.1)
SODIUM SERPL-SCNC: 136 MMOL/L (ref 136–145)

## 2021-12-12 VITALS — DIASTOLIC BLOOD PRESSURE: 50 MMHG | SYSTOLIC BLOOD PRESSURE: 139 MMHG

## 2021-12-12 VITALS — DIASTOLIC BLOOD PRESSURE: 62 MMHG | SYSTOLIC BLOOD PRESSURE: 146 MMHG

## 2021-12-12 VITALS — SYSTOLIC BLOOD PRESSURE: 125 MMHG | DIASTOLIC BLOOD PRESSURE: 62 MMHG

## 2021-12-12 VITALS — SYSTOLIC BLOOD PRESSURE: 136 MMHG | DIASTOLIC BLOOD PRESSURE: 56 MMHG

## 2021-12-12 VITALS — DIASTOLIC BLOOD PRESSURE: 65 MMHG | SYSTOLIC BLOOD PRESSURE: 128 MMHG

## 2021-12-12 VITALS — SYSTOLIC BLOOD PRESSURE: 146 MMHG | DIASTOLIC BLOOD PRESSURE: 60 MMHG

## 2021-12-12 PROCEDURE — 5A09357 ASSISTANCE WITH RESPIRATORY VENTILATION, LESS THAN 24 CONSECUTIVE HOURS, CONTINUOUS POSITIVE AIRWAY PRESSURE: ICD-10-PCS | Performed by: INTERNAL MEDICINE

## 2021-12-13 VITALS — DIASTOLIC BLOOD PRESSURE: 50 MMHG | SYSTOLIC BLOOD PRESSURE: 105 MMHG

## 2021-12-13 VITALS — SYSTOLIC BLOOD PRESSURE: 94 MMHG | DIASTOLIC BLOOD PRESSURE: 49 MMHG

## 2021-12-13 VITALS — DIASTOLIC BLOOD PRESSURE: 60 MMHG | SYSTOLIC BLOOD PRESSURE: 129 MMHG

## 2021-12-13 VITALS — DIASTOLIC BLOOD PRESSURE: 37 MMHG | SYSTOLIC BLOOD PRESSURE: 104 MMHG

## 2021-12-13 VITALS — SYSTOLIC BLOOD PRESSURE: 126 MMHG | DIASTOLIC BLOOD PRESSURE: 30 MMHG

## 2021-12-13 VITALS — DIASTOLIC BLOOD PRESSURE: 45 MMHG | SYSTOLIC BLOOD PRESSURE: 127 MMHG

## 2021-12-13 PROCEDURE — 5A09357 ASSISTANCE WITH RESPIRATORY VENTILATION, LESS THAN 24 CONSECUTIVE HOURS, CONTINUOUS POSITIVE AIRWAY PRESSURE: ICD-10-PCS | Performed by: INTERNAL MEDICINE

## 2021-12-14 VITALS — DIASTOLIC BLOOD PRESSURE: 38 MMHG | SYSTOLIC BLOOD PRESSURE: 105 MMHG

## 2021-12-14 VITALS — DIASTOLIC BLOOD PRESSURE: 42 MMHG | SYSTOLIC BLOOD PRESSURE: 128 MMHG

## 2021-12-14 VITALS — SYSTOLIC BLOOD PRESSURE: 107 MMHG | DIASTOLIC BLOOD PRESSURE: 66 MMHG

## 2021-12-14 LAB
ABSOLUTE BASOPHILS: 0 THOU/UL (ref 0–0.2)
ABSOLUTE EOSINOPHILS: 0 THOU/UL (ref 0–0.7)
ABSOLUTE MONOCYTES: 0.5 THOU/UL (ref 0–1.2)
ALBUMIN SERPL-MCNC: 1.7 G/DL (ref 3.4–5)
ALP SERPL-CCNC: 78 U/L (ref 46–116)
ALT SERPL-CCNC: 12 U/L (ref 30–65)
ANION GAP SERPL CALC-SCNC: 8 MMOL/L (ref 7–16)
AST SERPL-CCNC: 25 U/L (ref 15–37)
BASOPHILS NFR BLD AUTO: 0.4 %
BILIRUB SERPL-MCNC: 0.4 MG/DL
BUN SERPL-MCNC: 64 MG/DL (ref 7–18)
CALCIUM SERPL-MCNC: 8.6 MG/DL (ref 8.5–10.1)
CHLORIDE SERPL-SCNC: 105 MMOL/L (ref 98–107)
CO2 SERPL-SCNC: 23 MMOL/L (ref 21–32)
CREAT SERPL-MCNC: 3 MG/DL (ref 0.6–1.3)
EOSINOPHIL NFR BLD: 0.4 %
GLUCOSE SERPL-MCNC: 84 MG/DL (ref 70–99)
GRANULOCYTES NFR BLD MANUAL: 70.4 %
HCT VFR BLD CALC: 22.7 % (ref 37–47)
HGB BLD-MCNC: 7.3 GM/DL (ref 12–15)
LYMPHOCYTES # BLD: 0.7 THOU/UL (ref 0.8–5.3)
LYMPHOCYTES NFR BLD AUTO: 17.3 %
MCH RBC QN AUTO: 27 PG (ref 26–34)
MCHC RBC AUTO-ENTMCNC: 32.3 G/DL (ref 28–37)
MCV RBC: 83.5 FL (ref 80–100)
MONOCYTES NFR BLD: 11.5 %
MPV: 9.5 FL. (ref 7.2–11.1)
NEUTROPHILS # BLD: 2.9 THOU/UL (ref 1.6–8.1)
NUCLEATED RBCS: 0 /100WBC
PLATELET COUNT*: 77 THOU/UL (ref 150–400)
POTASSIUM SERPL-SCNC: 7.4 MMOL/L (ref 3.5–5.1)
PROT SERPL-MCNC: 6.1 G/DL (ref 6.4–8.2)
RBC # BLD AUTO: 2.72 MIL/UL (ref 4.2–5)
RDW-CV: 16.1 % (ref 10.5–14.5)
SODIUM SERPL-SCNC: 136 MMOL/L (ref 136–145)
WBC # BLD AUTO: 4.1 THOU/UL (ref 4–11)

## 2021-12-14 NOTE — CON
[address]  [address]                    CONSULTATION                  
_______________________________________________________________________________
 
Name:       COONTRUDY MATTY                 Room:           [address] [place]  
M.R.#:  [id number]      Account #:      [id number]  
Admission:  12/01/21     Attend Phys:    ALTHEA Basilio
Discharge:               Date of Birth:  [birth year]  
         Report #: [id number]
                                                                     [id number]
_______________________________________________________________________________
THIS REPORT FOR:  
 
cc:  Vinayak Sequeira MD, John J. MD Khosla, Parveen K. MD                                              ~
 
 
DATE OF CONSULTATION: 12/02/2021
 
HISTORY OF PRESENT ILLNESS:  An 86-year-old female patient who was seen by me 
with a poorly defined history.  I reviewed the records from the Emergency Room 
and she said that she was admitted when she slumped over.  She became better.  
When the Emergency Room people went there, she was profoundly hypotensive.  She 
is better today.  She does not remember much about yesterday about what brought 
her to the hospital.
 
REVIEW OF SYSTEMS:  Positive for diabetes, hypertension, hysterectomy ulcer and 
apparently carcinoma with chemotherapy.  She has generalized fatigue.  In the 
Emergency Room, she was found to have atrial fibrillation, requiring 
cardioversion.  She to me denies any cervical spine problem.  She does have a 
history of hypertension.  When she was admitted, she was found to have pneumonia
versus pulmonary hemorrhage that is per report. She had a CT abdomen and that 
also confirms what looks like pneumonia.  She has kidney problems for some time.
 She follows up with [facility].  When she came in, she had hypokalemia, which is 
actually worse today.  Hemoglobin was only 7.1.  Her platelet count was also 
low.  She denies any eye, ENT, cardiac, respiratory symptoms.  She denies any 
musculoskeletal, dermatological, hematological, constitutional, psychiatric, 
throat symptom associated with present symptomatology.
 
PAST MEDICAL HISTORY:  Positive for carcinoma and chemotherapy according to the 
patient.
 
FAMILY HISTORY:  Negative for early age stroke.
 
SOCIAL HISTORY:  She denies abuse of alcohol.
 
PHYSICAL EXAMINATION:
NEUROLOGIC:  The patient's examination indicate she is alert.  She is actually 
oriented.  Her speech looks intact.  Memory is diminished, but she thinks it is 
her baseline.  Cranial nerve examination 2-12 looks unremarkable.  She has a 
pretty marked weakness of both lower extremities, which she says is going on for
some time, but does not know how long.  It is several weeks to several months.  
She does reasonably well with position sense.  Her reflexes are diminished.  It 
will appear that both the plantars are upgoing but for me it is difficult to 
tell for sure.
EXTREMITIES:  Pulses are palpable.  She has no edema.
 
 
 
[place] [address]                    CONSULTATION                  
_______________________________________________________________________________
 
Name:       TRUDY BROWN                 Room:           [address] [place]  
[facility]#:  [id number]      Account #:      [id number]  
Admission:  12/01/21     Attend Phys:    ALTHEA Basilio
Discharge:               Date of Birth:  [birth year]  
         Report #: [id number]
                                                                     [id number]
_______________________________________________________________________________
 
 
NECK:  There is no thyroid mass.  There is no carotid bruit.  There is no 
meningeal sign.
RESPIRATORY:  No respiratory difficulty.
CARDIAC:  Examination indicate that she does have AFib when she came in.
VITAL SIGNS:  Blood pressure is 102/45, pulse is 92, temperature is 97.
 
LABORATORY DATA:  White count is 12.9, platelet count is 121. Potassium is only 
2.5.  Respiratory examination does not appear to be showing any respiratory 
difficulty.  Her GFR is only 7.
 
IMPRESSION AND PLAN:  Numerous medical problem in this patient including kidney 
failure, hypokalemia, anemia, which will cause the patient's generalized 
fatigue.  Other causes are possible, but less likely.  She also had a pneumonia,
which will cause encephalopathy.  Weakness in the legs are profound and I will 
get an MRI.  She says there is no contraindication for that.  I will get a 
carotid Doppler, but otherwise I will be inclined to do less workup in this 
patient because I think we should carry out conservative care with her and I 
discussed with her and she is agreeable with all this plan.
 
Thank you very much for this referral.
 
 
 
 
 
 
 
 
 
 
 
 
 
 
 
 
 
 
 
 
 
 
<ELECTRONICALLY SIGNED>
                                        By:  Orlando Bah MD         
12/14/21     1802
D: 12/02/21 1202_______________________________________
T: 12/02/21 1421Psarah Bah MD            /nt

## 2021-12-15 VITALS — SYSTOLIC BLOOD PRESSURE: 124 MMHG | DIASTOLIC BLOOD PRESSURE: 58 MMHG

## 2022-01-11 ENCOUNTER — HOSPITAL ENCOUNTER (INPATIENT)
Dept: HOSPITAL 96 - M.ERS | Age: 87
LOS: 1 days | DRG: 871 | End: 2022-01-12
Attending: INTERNAL MEDICINE | Admitting: INTERNAL MEDICINE
Payer: MEDICARE

## 2022-01-11 VITALS — SYSTOLIC BLOOD PRESSURE: 80 MMHG | DIASTOLIC BLOOD PRESSURE: 37 MMHG

## 2022-01-11 VITALS — HEIGHT: 63 IN | BODY MASS INDEX: 35.44 KG/M2 | WEIGHT: 200 LBS

## 2022-01-11 DIAGNOSIS — E43: ICD-10-CM

## 2022-01-11 DIAGNOSIS — D64.9: ICD-10-CM

## 2022-01-11 DIAGNOSIS — Z20.822: ICD-10-CM

## 2022-01-11 DIAGNOSIS — Z90.710: ICD-10-CM

## 2022-01-11 DIAGNOSIS — I13.0: ICD-10-CM

## 2022-01-11 DIAGNOSIS — Z85.41: ICD-10-CM

## 2022-01-11 DIAGNOSIS — N13.9: ICD-10-CM

## 2022-01-11 DIAGNOSIS — N17.9: ICD-10-CM

## 2022-01-11 DIAGNOSIS — I50.9: ICD-10-CM

## 2022-01-11 DIAGNOSIS — J96.01: ICD-10-CM

## 2022-01-11 DIAGNOSIS — Z93.6: ICD-10-CM

## 2022-01-11 DIAGNOSIS — Z66: ICD-10-CM

## 2022-01-11 DIAGNOSIS — R65.21: ICD-10-CM

## 2022-01-11 DIAGNOSIS — Z85.3: ICD-10-CM

## 2022-01-11 DIAGNOSIS — A41.9: Primary | ICD-10-CM

## 2022-01-11 DIAGNOSIS — N18.9: ICD-10-CM

## 2022-01-11 DIAGNOSIS — E11.22: ICD-10-CM

## 2022-01-11 DIAGNOSIS — E87.5: ICD-10-CM

## 2022-01-11 DIAGNOSIS — J15.9: ICD-10-CM

## 2022-01-11 DIAGNOSIS — M19.90: ICD-10-CM

## 2022-01-11 LAB
ABSOLUTE BASOPHILS: 0 THOU/UL (ref 0–0.2)
ABSOLUTE EOSINOPHILS: 0 THOU/UL (ref 0–0.7)
ABSOLUTE MONOCYTES: 0 THOU/UL (ref 0–1.2)
ANION GAP SERPL CALC-SCNC: 10 MMOL/L (ref 7–16)
ANION GAP SERPL CALC-SCNC: 12 MMOL/L (ref 7–16)
ANION GAP SERPL CALC-SCNC: 12 MMOL/L (ref 7–16)
ANION GAP SERPL CALC-SCNC: 9 MMOL/L (ref 7–16)
BASOPHILS NFR BLD AUTO: 0 %
BE: -9.1 MMOL/L
BUN SERPL-MCNC: 54 MG/DL (ref 7–18)
BUN SERPL-MCNC: 57 MG/DL (ref 7–18)
BUN SERPL-MCNC: 58 MG/DL (ref 7–18)
BUN SERPL-MCNC: 61 MG/DL (ref 7–18)
CALCIUM SERPL-MCNC: 7.3 MG/DL (ref 8.5–10.1)
CALCIUM SERPL-MCNC: 8.2 MG/DL (ref 8.5–10.1)
CALCIUM SERPL-MCNC: 8.5 MG/DL (ref 8.5–10.1)
CALCIUM SERPL-MCNC: 8.6 MG/DL (ref 8.5–10.1)
CHLORIDE SERPL-SCNC: 101 MMOL/L (ref 98–107)
CHLORIDE SERPL-SCNC: 103 MMOL/L (ref 98–107)
CHLORIDE SERPL-SCNC: 105 MMOL/L (ref 98–107)
CHLORIDE SERPL-SCNC: 106 MMOL/L (ref 98–107)
CO2 SERPL-SCNC: 15 MMOL/L (ref 21–32)
CO2 SERPL-SCNC: 20 MMOL/L (ref 21–32)
CO2 SERPL-SCNC: 21 MMOL/L (ref 21–32)
CO2 SERPL-SCNC: 22 MMOL/L (ref 21–32)
CREAT SERPL-MCNC: 4.4 MG/DL (ref 0.6–1.3)
CREAT SERPL-MCNC: 4.8 MG/DL (ref 0.6–1.3)
CREAT SERPL-MCNC: 4.9 MG/DL (ref 0.6–1.3)
CREAT SERPL-MCNC: 5.1 MG/DL (ref 0.6–1.3)
EOSINOPHIL NFR BLD: 0 %
GLUCOSE SERPL-MCNC: 109 MG/DL (ref 70–99)
GLUCOSE SERPL-MCNC: 112 MG/DL (ref 70–99)
GLUCOSE SERPL-MCNC: 48 MG/DL (ref 70–99)
GLUCOSE SERPL-MCNC: 603 MG/DL (ref 70–99)
GRANULOCYTES NFR BLD MANUAL: 98 %
HCT VFR BLD CALC: 22.1 % (ref 37–47)
HCT VFR BLD CALC: 27.6 % (ref 37–47)
HGB BLD-MCNC: 6.8 GM/DL (ref 12–15)
HGB BLD-MCNC: 8.7 GM/DL (ref 12–15)
LYMPHOCYTES # BLD: 0.4 THOU/UL (ref 0.8–5.3)
LYMPHOCYTES NFR BLD AUTO: 2 %
MCH RBC QN AUTO: 27.3 PG (ref 26–34)
MCH RBC QN AUTO: 28.4 PG (ref 26–34)
MCHC RBC AUTO-ENTMCNC: 30.8 G/DL (ref 28–37)
MCHC RBC AUTO-ENTMCNC: 31.4 G/DL (ref 28–37)
MCV RBC: 88.6 FL (ref 80–100)
MCV RBC: 90.5 FL (ref 80–100)
MONOCYTES NFR BLD: 0 %
MPV: 10 FL. (ref 7.2–11.1)
MPV: 10.2 FL. (ref 7.2–11.1)
NEUTROPHILS # BLD: 17.2 THOU/UL (ref 1.6–8.1)
NEUTS BAND NFR BLD: 0 %
NUCLEATED RBCS: 0 /100WBC
NUCLEATED RBCS: 1 /100WBC
PCO2 BLD: 32 MMHG (ref 35–45)
PLATELET # BLD EST: ADEQUATE 10*3/UL
PLATELET COUNT*: 116 THOU/UL (ref 150–400)
PLATELET COUNT*: 120 THOU/UL (ref 150–400)
PO2 BLD: 81.3 MMHG (ref 75–100)
POTASSIUM SERPL-SCNC: 5 MMOL/L (ref 3.5–5.1)
POTASSIUM SERPL-SCNC: 6.3 MMOL/L (ref 3.5–5.1)
POTASSIUM SERPL-SCNC: 6.8 MMOL/L (ref 3.5–5.1)
POTASSIUM SERPL-SCNC: 7.2 MMOL/L (ref 3.5–5.1)
RBC # BLD AUTO: 2.49 MIL/UL (ref 4.2–5)
RBC # BLD AUTO: 3.05 MIL/UL (ref 4.2–5)
RBC MORPH BLD: NORMAL
RDW-CV: 19.9 % (ref 10.5–14.5)
RDW-CV: 21.7 % (ref 10.5–14.5)
SODIUM SERPL-SCNC: 132 MMOL/L (ref 136–145)
SODIUM SERPL-SCNC: 132 MMOL/L (ref 136–145)
SODIUM SERPL-SCNC: 133 MMOL/L (ref 136–145)
SODIUM SERPL-SCNC: 139 MMOL/L (ref 136–145)
WBC # BLD AUTO: 16.7 THOU/UL (ref 4–11)
WBC # BLD AUTO: 17.5 THOU/UL (ref 4–11)

## 2022-01-11 PROCEDURE — 30233N1 TRANSFUSION OF NONAUTOLOGOUS RED BLOOD CELLS INTO PERIPHERAL VEIN, PERCUTANEOUS APPROACH: ICD-10-PCS | Performed by: INTERNAL MEDICINE

## 2022-01-11 NOTE — EMS
[facility] [place] 
[address] [place] [address]                    EMS Patient Care Report       
_______________________________________________________________________________
 
Name:       TRUDY BROWN                 Room:                      REG ER  
M.R.#:  [id number]      Account #:      [id number]  
Admission:  22     Attend Phys:                         
Discharge:               Date of Birth:  [birth year]  
         Report #: [id number]
                                                                     [id number]
_______________________________________________________________________________
THIS REPORT FOR:  //name//                      
 
Report Transmitted: 2022 09:13
EMS Care Summary
[facility]
Incident  @ 2022 07:36
 
Incident Location
[facility]
 
Patient
TRUDY BROWN
Female, 86 Years
 1935
 
Patient Address
[address] [place] [address]
 
Patient History
Hypertension (HTN),Kidney/Renal Failure,Gastro-Esophageal Reflux Disease 
(GERD),Breast Cancer,Kidney Cancer,Atrial Fibrillation, 
 
Patient Allergies
No known allergies,
 
Patient Medications
Other,
 
Chief Complaint
SOB
 
Disposition
Transported No Lights/[place]
 
Dispatch Reason
Breathing Problem
 
Transported To
[facility]
 
Narrative
Dispatched to a nursing home for 87y/o female SOB. Upon arrival pt. was lying 
in bed with O2 via NC at 2 lpm. Pt. was A&Ox4 and c/o SOB x2 days. Pt. has 
history of resp. failure. Pt. had an accessed port in place upper right chest 
and bi-lat. kidney catheters. Pt. had edema both legs and left arm. Pt. had 
 
 
 
[facility] [place] 
[address] [place] [address]                    EMS Patient Care Report       
_______________________________________________________________________________
 
Name:       TRUDY BROWN                 Room:                      REG ER  
M.R.#:  [id number]      Account #:      [id number]  
Admission:  22     Attend Phys:                         
Discharge:               Date of Birth:  [birth year]  
         Report #: [id number]
                                                                     [id number]
_______________________________________________________________________________
right mastectomy. IV was not attempted due to swelling. Pt. breath sounds were 
diminished lower fields and wheezes in upper fields. Post Duoneb pt. stated 
that it felt easier to breathe. 12 lead EKG showed A-fib but had too much 
artifact for further interpretation. Pt. remained stable during transport. Pt. 
was transported to [place] for emergency services. 
 
Initial Vitals
@07:45P: 70,R: 24,BP: 135/73,GCS: 15,SpO2: 88,Revised Trauma: 12,
@08:00P: 60,R: 20,BP: 103/47,GCS: 15,Revised Trauma: 12,
 
 
Impression
Shortness of breath
 
Procedures
@PTAOxygen FlowRate: 2 Device: Nasal Cannula (NC)  Response: UnchangedSucceeded
@07:55 Oxygen FlowRate: 6 Device: Nebulizer  Response: ImprovedSucceeded
@07:55 Duoneb - 3.5 Milligrams (mg) - Non-Rebreather Mask Response: Improved
 
 
Timeline
PTA,Oxygen FlowRate: 2 Device: Nasal Cannula (NC) Response: UnchangedSucceeded,
07:33,Call Received
07:36,Dispatched
07:36,En Route
07:38,Initial Responder On Scene
07:38,On Scene
07:40,At Patient
07:45,BP: 135/73 M,PULSE: 70,RR: 24 R,SPO2: 88 Ox,ETCO2:  ,BG: ,PAIN: ,GCS: 15,
07:50,Depart Scene
07:55,Oxygen FlowRate: 6 Device: Nebulizer Response: ImprovedSucceeded,
07:55,Duoneb - 3.5 Milligrams (mg) - Non-Rebreather Mask,Response: Improved
08:00,BP: 103/47 M,PULSE: 60,RR: 20 R,SPO2:  Ox,ETCO2:  ,BG: ,PAIN: ,GCS: 15,
08:11,At Destination
08:15,Transfer Patient
08:34,Call Closed
08:49,In District
 
Disclaimer
v1.1     Copyright  [employer]
This EMS Care Summary contains data elements from the applicable legal record 
(which may be displayed differently). It is designed to provide pertinent 
information for the following purposes: continuity of care, clinical quality, 
and state data reporting. The complete legal record is available to ED staff 
and administrators of the receiving hospital in [employer]'s Patient Tracker. All data 
is provided "as is."

## 2022-01-11 NOTE — EKG
[place] [address]
Phone:  [phone]                     ELECTROCARDIOGRAM REPORT      
_______________________________________________________________________________
 
Name:         TRUDY BROWN                Room:                     REG ER 
M.R.#:    [id number]     Account #:     [id number]  
Admission:    22    Attend Phys:                     
Discharge:                Date of Birth: [birth year]  
Date of Service: 22  Report #:      [id number]
        [id number]
_______________________________________________________________________________
THIS REPORT FOR:  //name//                      
 
                         [facility] ED
                                       
Test Date:    2022               Test Time:    08:27:06
Pat Name:     TRUDY BROWN             Department:   
Patient ID:   [id number]            Room:          
Gender:                               Technician:   
:          1935               Requested By: Carlitos De Leon
Order Number: [id number] MD:   Vinayak Mendez
                                 Measurements
Intervals                              Axis          
Rate:         46                       P:            -39
CT:           210                      QRS:          -50
QRSD:         140                      T:            83
QT:           474                                    
QTc:          415                                    
                           Interpretive Statements
Junctional bradycardia with occasional sinus beats
Supraventricular bigeminy
IVCD, consider atypical RBBB
Compared to ECG 2021 09:28:23
Junctional rhythm with occasional sinus beats is noted
Atrial fibrillation no longer present
Electronically Signed On 2022 10:08:24 CST by Vinayak Mendez
[email or web address]
 
 
 
 
 
 
 
 
 
 
 
 
 
 
 
 
 
 
  <ELECTRONICALLY SIGNED>
                                           By: Vinayak Mendez MD, [facility]     
  22     1008
D: 22   _____________________________________
T: 22   Vinayak Mendez MD, [facility]       /EPI

## 2022-01-12 VITALS — DIASTOLIC BLOOD PRESSURE: 58 MMHG | SYSTOLIC BLOOD PRESSURE: 146 MMHG

## 2022-01-12 VITALS — DIASTOLIC BLOOD PRESSURE: 70 MMHG | SYSTOLIC BLOOD PRESSURE: 112 MMHG

## 2022-01-12 LAB
ABSOLUTE BASOPHILS: 0 THOU/UL (ref 0–0.2)
ABSOLUTE EOSINOPHILS: 0 THOU/UL (ref 0–0.7)
ABSOLUTE MONOCYTES: 0.5 THOU/UL (ref 0–1.2)
ABSOLUTE MONOCYTES: 1 THOU/UL (ref 0–1.2)
ANION GAP SERPL CALC-SCNC: 14 MMOL/L (ref 7–16)
ANISOCYTOSIS BLD QL SMEAR: (no result)
BASOPHILS NFR BLD AUTO: 0.1 %
BUN SERPL-MCNC: 58 MG/DL (ref 7–18)
CALCIUM SERPL-MCNC: 8.7 MG/DL (ref 8.5–10.1)
CHLORIDE SERPL-SCNC: 105 MMOL/L (ref 98–107)
CO2 SERPL-SCNC: 21 MMOL/L (ref 21–32)
CREAT SERPL-MCNC: 4.8 MG/DL (ref 0.6–1.3)
EOSINOPHIL NFR BLD: 0.2 %
GLUCOSE SERPL-MCNC: 85 MG/DL (ref 70–99)
GRANULOCYTES NFR BLD MANUAL: 65 %
GRANULOCYTES NFR BLD MANUAL: 94 %
HCT VFR BLD CALC: 30.3 % (ref 37–47)
HGB BLD-MCNC: 9.6 GM/DL (ref 12–15)
LYMPHOCYTES # BLD: 0.2 THOU/UL (ref 0.8–5.3)
LYMPHOCYTES # BLD: 0.3 THOU/UL (ref 0.8–5.3)
LYMPHOCYTES NFR BLD AUTO: 1 %
LYMPHOCYTES NFR BLD AUTO: 2.2 %
MCH RBC QN AUTO: 28.6 PG (ref 26–34)
MCHC RBC AUTO-ENTMCNC: 31.5 G/DL (ref 28–37)
MCV RBC: 90.6 FL (ref 80–100)
MONOCYTES NFR BLD: 3.5 %
MONOCYTES NFR BLD: 6 %
MPV: 11 FL. (ref 7.2–11.1)
NEUTROPHILS # BLD: 13.4 THOU/UL (ref 1.6–8.1)
NEUTROPHILS # BLD: 15.5 THOU/UL (ref 1.6–8.1)
NEUTS BAND NFR BLD: 28 %
NUCLEATED RBCS: 1 /100WBC
PLATELET # BLD EST: (no result) 10*3/UL
PLATELET COUNT*: 124 THOU/UL (ref 150–400)
POLYCHROMASIA BLD QL SMEAR: (no result)
POTASSIUM SERPL-SCNC: 4.3 MMOL/L (ref 3.5–5.1)
RBC # BLD AUTO: 3.35 MIL/UL (ref 4.2–5)
RDW-CV: 19.6 % (ref 10.5–14.5)
SODIUM SERPL-SCNC: 140 MMOL/L (ref 136–145)
WBC # BLD AUTO: 14.3 THOU/UL (ref 4–11)

## 2022-01-12 NOTE — NUR
PT IS CURRENTLY A DNR/DNI, PAPERWORK ON CHART. AT 0600 PT WAS FOUND TO HAVE
WORSENING OXYGENATION/AGONAL BREATHING, AND BRADYCARDIA. MD GUERRERO NOTIFIED AND
AT BEDSIDE. MD GUERRERO CALLED TIME OF DEATH AT 0613. PER MD GUERRERO, "MD ABEL
NOTIFIED VIA PHONE OF TOD." THIS RN SENT A YOUCALL TO MD FAJARDO TO NOTIFY HIM
THAT PT  AND TOD WAS AT 0613, NO RESPONSE AT THIS TIME. [facility] WAS NOTIFIED
WITHIN 30 MIN OF PT'S DEATH. PT IS NOT A CANDIDATE OF [facility], REFERRAL
#[id number]. PT'S SON, DARRIAN BROWN WAS NOTIFIED VIA PHONE. DARRIAN BROWN
REQUESTED THE PATIENT'S BODY GO TO [facility]  [facility] IN [place], [place].
THE DEATH PACKET PAPERWORK HAS BEEN COMPLETED AND REPORT HAS BEEN GIVEN TO ED
RN.